# Patient Record
Sex: FEMALE | Race: WHITE | URBAN - METROPOLITAN AREA
[De-identification: names, ages, dates, MRNs, and addresses within clinical notes are randomized per-mention and may not be internally consistent; named-entity substitution may affect disease eponyms.]

---

## 2015-01-26 LAB — PAP SMEAR - HIM PATIENT REPORTED: POSITIVE

## 2016-01-29 LAB — PAP SMEAR - HIM PATIENT REPORTED: POSITIVE

## 2017-02-27 LAB
HPV ABSTRACT: NORMAL
PAP SMEAR - HIM PATIENT REPORTED: NORMAL
TSH SERPL-ACNC: 1.61 MIU/L (ref 0.34–4.82)

## 2017-11-25 ENCOUNTER — OFFICE VISIT (OUTPATIENT)
Dept: URGENT CARE | Facility: URGENT CARE | Age: 32
End: 2017-11-25
Payer: COMMERCIAL

## 2017-11-25 ENCOUNTER — APPOINTMENT (OUTPATIENT)
Dept: ULTRASOUND IMAGING | Facility: CLINIC | Age: 32
End: 2017-11-25
Attending: EMERGENCY MEDICINE
Payer: COMMERCIAL

## 2017-11-25 ENCOUNTER — HOSPITAL ENCOUNTER (EMERGENCY)
Facility: CLINIC | Age: 32
Discharge: HOME | End: 2017-11-25
Attending: EMERGENCY MEDICINE | Admitting: EMERGENCY MEDICINE
Payer: COMMERCIAL

## 2017-11-25 VITALS
HEIGHT: 66 IN | BODY MASS INDEX: 36.96 KG/M2 | WEIGHT: 230 LBS | SYSTOLIC BLOOD PRESSURE: 141 MMHG | OXYGEN SATURATION: 98 % | TEMPERATURE: 98.5 F | DIASTOLIC BLOOD PRESSURE: 79 MMHG

## 2017-11-25 VITALS
DIASTOLIC BLOOD PRESSURE: 78 MMHG | HEART RATE: 88 BPM | RESPIRATION RATE: 16 BRPM | SYSTOLIC BLOOD PRESSURE: 125 MMHG | WEIGHT: 235 LBS | OXYGEN SATURATION: 98 %

## 2017-11-25 DIAGNOSIS — Z32.01 PREGNANCY TEST POSITIVE: Primary | ICD-10-CM

## 2017-11-25 DIAGNOSIS — Z32.01 PREGNANCY TEST POSITIVE: ICD-10-CM

## 2017-11-25 LAB
B-HCG SERPL-ACNC: 2232 IU/L (ref 0–5)
BASOPHILS # BLD AUTO: 0 10E9/L (ref 0–0.2)
BASOPHILS NFR BLD AUTO: 0.4 %
BETA HCG QUAL IFA URINE: POSITIVE
DIFFERENTIAL METHOD BLD: NORMAL
EOSINOPHIL # BLD AUTO: 0.2 10E9/L (ref 0–0.7)
EOSINOPHIL NFR BLD AUTO: 2.9 %
ERYTHROCYTE [DISTWIDTH] IN BLOOD BY AUTOMATED COUNT: 13.5 % (ref 10–15)
HCT VFR BLD AUTO: 38.9 % (ref 35–47)
HGB BLD-MCNC: 13.2 G/DL (ref 11.7–15.7)
IMM GRANULOCYTES # BLD: 0 10E9/L (ref 0–0.4)
IMM GRANULOCYTES NFR BLD: 0 %
LYMPHOCYTES # BLD AUTO: 1.8 10E9/L (ref 0.8–5.3)
LYMPHOCYTES NFR BLD AUTO: 32.9 %
MCH RBC QN AUTO: 29.5 PG (ref 26.5–33)
MCHC RBC AUTO-ENTMCNC: 33.9 G/DL (ref 31.5–36.5)
MCV RBC AUTO: 87 FL (ref 78–100)
MONOCYTES # BLD AUTO: 0.7 10E9/L (ref 0–1.3)
MONOCYTES NFR BLD AUTO: 13.2 %
NEUTROPHILS # BLD AUTO: 2.8 10E9/L (ref 1.6–8.3)
NEUTROPHILS NFR BLD AUTO: 50.6 %
NRBC # BLD AUTO: 0 10*3/UL
NRBC BLD AUTO-RTO: 0 /100
PLATELET # BLD AUTO: 304 10E9/L (ref 150–450)
RBC # BLD AUTO: 4.47 10E12/L (ref 3.8–5.2)
WBC # BLD AUTO: 5.5 10E9/L (ref 4–11)

## 2017-11-25 PROCEDURE — 99284 EMERGENCY DEPT VISIT MOD MDM: CPT | Mod: 25

## 2017-11-25 PROCEDURE — 84703 CHORIONIC GONADOTROPIN ASSAY: CPT | Performed by: INTERNAL MEDICINE

## 2017-11-25 PROCEDURE — 99202 OFFICE O/P NEW SF 15 MIN: CPT | Performed by: INTERNAL MEDICINE

## 2017-11-25 PROCEDURE — 85025 COMPLETE CBC W/AUTO DIFF WBC: CPT | Performed by: EMERGENCY MEDICINE

## 2017-11-25 PROCEDURE — 84702 CHORIONIC GONADOTROPIN TEST: CPT | Performed by: EMERGENCY MEDICINE

## 2017-11-25 PROCEDURE — 76801 OB US < 14 WKS SINGLE FETUS: CPT

## 2017-11-25 RX ORDER — COPPER 313.4 MG/1
1 INTRAUTERINE DEVICE INTRAUTERINE ONCE
COMMUNITY
End: 2017-12-13

## 2017-11-25 ASSESSMENT — ENCOUNTER SYMPTOMS
NAUSEA: 1
VOMITING: 0
ABDOMINAL PAIN: 0

## 2017-11-25 NOTE — ED AVS SNAPSHOT
Emergency Department    [address] [facility] [address]    Phone:  [phone]    Fax:  [phone]                                       Elizabeth Crooks   MRN: [id number]    Department:   Emergency Department   Date of Visit:  11/25/2017           Patient Information     Date Of Birth          1985        Your diagnoses for this visit were:     Pregnancy test positive        You were seen by Sophia Layne MD.      Follow-up Information     Follow up with  Emergency Department.    Specialty:  EMERGENCY MEDICINE    Why:  immediately , If symptoms worsen    Contact information:    [address] [facility] [address] [phone]        Follow up with Nissa Alonzo MD In 1 week.    Specialty:  OB/Gyn    Contact information:    [place] [place]  [address] [place] [address] [phone]        Discharge References/Attachments     PREGNANCY, ESTABLISHED, NORMAL SYMPTOMS (ENGLISH)      24 Hour Appointment Hotline       To make an appointment at any [place] clinic, call [phone] ([phone]). If you don't have a family doctor or clinic, we will help you find one. [place] clinics are conveniently located to serve the needs of you and your family.             Review of your medicines      Our records show that you are taking the medicines listed below. If these are incorrect, please call your family doctor or clinic.        Dose / Directions Last dose taken    paragard intrauterine copper   Dose:  1 each        1 each by Intrauterine route once   Refills:  0                Procedures and tests performed during your visit     CBC with platelets differential    HCG quantitative pregnancy    US OB <14 Weeks W Transvaginal      Orders Needing Specimen Collection     None      Pending Results     Date and Time Order Name Status Description    11/25/2017 1009 US OB <14 Weeks W Transvaginal Preliminary             Pending Culture Results      No orders found from 11/23/2017 to 11/26/2017.            Pending Results Instructions     If you had any lab results that were not finalized at the time of your Discharge, you can call the ED Lab Result RN at [phone]. You will be contacted by this team for any positive Lab results or changes in treatment. The nurses are available 7 days a week from 10A to 6:30P.  You can leave a message 24 hours per day and they will return your call.        Test Results From Your Hospital Stay        11/25/2017 11:10 AM      Component Results     Component Value Ref Range & Units Status    HCG Quantitative Serum 2232 (H) 0 - 5 IU/L Final         11/25/2017 10:36 AM      Component Results     Component Value Ref Range & Units Status    WBC 5.5 4.0 - 11.0 10e9/L Final    RBC Count 4.47 3.8 - 5.2 10e12/L Final    Hemoglobin 13.2 11.7 - 15.7 g/dL Final    Hematocrit 38.9 35.0 - 47.0 % Final    MCV 87 78 - 100 fl Final    MCH 29.5 26.5 - 33.0 pg Final    MCHC 33.9 31.5 - 36.5 g/dL Final    RDW 13.5 10.0 - 15.0 % Final    Platelet Count 304 150 - 450 10e9/L Final    Diff Method Automated Method  Final    % Neutrophils 50.6 % Final    % Lymphocytes 32.9 % Final    % Monocytes 13.2 % Final    % Eosinophils 2.9 % Final    % Basophils 0.4 % Final    % Immature Granulocytes 0.0 % Final    Nucleated RBCs 0 0 /100 Final    Absolute Neutrophil 2.8 1.6 - 8.3 10e9/L Final    Absolute Lymphocytes 1.8 0.8 - 5.3 10e9/L Final    Absolute Monocytes 0.7 0.0 - 1.3 10e9/L Final    Absolute Eosinophils 0.2 0.0 - 0.7 10e9/L Final    Absolute Basophils 0.0 0.0 - 0.2 10e9/L Final    Abs Immature Granulocytes 0.0 0 - 0.4 10e9/L Final    Absolute Nucleated RBC 0.0  Final         11/25/2017 11:21 AM      Narrative     ULTRASOUND OBSTETRIC LESS THAN 14 WEEKS WITH TRANSVAGINAL SINGLE   11/25/2017 11:15 AM    HISTORY: Suspects she has IUD, positive pregnancy, LMP 10/11.     TECHNIQUE: Transabdominal and transvaginal imaging were performed.  Transvaginal  imaging was performed to better evaluate the uterus and  gestational sac.    COMPARISON: None.    FINDINGS: There is a small fluid collection in the uterus, likely a  gestational sac. It measures 5 mm corresponding with a gestational age  of 5 weeks 1 day. No yolk sac or fetal pole is identified.    Both ovaries are normal in appearance. No adnexal masses are seen.  There is no free fluid in the pelvis.         Impression     IMPRESSION: Small fluid collection in the uterus likely representing  an early gestational sac. No yolk sac or fetal pole is seen.                     Clinical Quality Measure: Blood Pressure Screening     Your blood pressure was checked while you were in the emergency department today. The last reading we obtained was  BP: 141/79 . Please read the guidelines below about what these numbers mean and what you should do about them.  If your systolic blood pressure (the top number) is less than 120 and your diastolic blood pressure (the bottom number) is less than 80, then your blood pressure is normal. There is nothing more that you need to do about it.  If your systolic blood pressure (the top number) is 120-139 or your diastolic blood pressure (the bottom number) is 80-89, your blood pressure may be higher than it should be. You should have your blood pressure rechecked within a year by a primary care provider.  If your systolic blood pressure (the top number) is 140 or greater or your diastolic blood pressure (the bottom number) is 90 or greater, you may have high blood pressure. High blood pressure is treatable, but if left untreated over time it can put you at risk for heart attack, stroke, or kidney failure. You should have your blood pressure rechecked by a primary care provider within the next 4 weeks.  If your provider in the emergency department today gave you specific instructions to follow-up with your doctor or provider even sooner than that, you should follow that instruction and not  "wait for up to 4 weeks for your follow-up visit.        Thank you for choosing [facility]       Thank you for choosing [facility] for your care. Our goal is always to provide you with excellent care. Hearing back from our patients is one way we can continue to improve our services. Please take a few minutes to complete the written survey that you may receive in the mail after you visit with us. Thank you!        Lakoohart Information     [employer] lets you send messages to your doctor, view your test results, renew your prescriptions, schedule appointments and more. To sign up, go to [email or web address] . Click on \"Log in\" on the left side of the screen, which will take you to the Welcome page. Then click on \"Sign up Now\" on the right side of the page.     You will be asked to enter the access code listed below, as well as some personal information. Please follow the directions to create your username and password.     Your access code is: [email or web address]  Expires: 2018 11:44 AM     Your access code will  in 90 days. If you need help or a new code, please call your [facility] clinic or [phone].        Care EveryWhere ID     This is your Care EveryWhere ID. This could be used by other organizations to access your [facility] medical records  [id number]        Equal Access to Services     KYLE ROCHA AH: Rosalio Chau, waaxda luqadaha, qaybta kaalmada antonette, yadira sellers. So [facility] [phone].    ATENCIÓN: Si habla español, tiene a mcrae disposición servicios gratuitos de asistencia lingüística. Llame al [phone].    We comply with applicable federal civil rights laws and [place] laws. We do not discriminate on the basis of race, color, national origin, age, disability, sex, sexual orientation, or gender identity.            After Visit Summary       This is your record. Keep this with you and show to your community pharmacist(s) and doctor(s) at your next " visit.

## 2017-11-25 NOTE — ED PROVIDER NOTES
"  History     Chief Complaint:  IUD issue    HPI   Elizabeth Crooks is a 32 year old  pregnant female who presents with an IUD issues. The patient states that she has had an IUD in place for almost one year but has had multiple positive pregnancy tests now. She states that her last menstrual cycle occurred approximately 6 weeks ago. She reports feeling nauseous yesterday without any vomiting and became concerned that she may be pregnant. She took two positive pregnancy tests at home and today went to Urgent Care where she had a positive HCG urine test as well. She notes that she does have fairly regular menstrual cycles. She denies having any abdominal pain at this time. Of note the patient has recently relocated to [place].    Allergies:  No Known Drug Allergies      Medications:    Paragard intrauterine copper    Past Medical History:    Thyroid disease    Past Surgical History:    History reviewed. No pertinent past surgical history.     Family History:    The patient denies any relevant family medical history.     Social History:  Smoking Status: No  Smokeless Tobacco: No  Alcohol Use: No   Marital Status:   [2]    Review of Systems   Gastrointestinal: Positive for nausea. Negative for abdominal pain and vomiting.   All other systems reviewed and are negative.      Physical Exam   Vitals:  Patient Vitals for the past 24 hrs:   BP Temp Temp src Heart Rate SpO2 Height Weight   17 0944 141/79 98.5  F (36.9  C) Oral 102 98 % 1.676 m (5' 6\") 104.3 kg (230 lb)        Physical Exam  Gen: Pleasant, appears stated age.    Eye:   Pupils are equal, round, and reactive.     Sclera non-injected.    ENT:   Moist mucus membranes.     Normal tongue.    Oropharynx without lesions.    Cardiac:     Normal rate and regular rhythm.    No murmurs, gallops, or rubs.    Pulmonary:     Clear to auscultation bilaterally.    No wheezes, rales, or rhonchi.    Abdomen:     Normal active bowel sounds.     Abdomen is " soft and non-distended, without focal tenderness.    Musculoskeletal:     Normal movement of all extremities without evidence for deficit.    Extremities:    No edema.    Skin:   Warm and dry.    Neurologic:    Non-focal exam without asymmetric weakness or numbness.    Normal tone    Psychiatric:     Normal affect with appropriate interaction with examiner.     :  External exam: no lesions, no erythema/cellulitis  Internal exam: Normal introitus.   No vaginal discharge noted. No IUD in vault.  No blood noted.  Bimanual exam:    No Cervical motion tenderness.  No string from IUD.  Bilateral adnexa non-tender.  Uterus non-tender    Emergency Department Course   Imaging:  Radiology findings were communicated with the patient who voiced understanding of the findings.  US OB <14 weeks w transvaginal:  IMPRESSION: Small fluid collection in the uterus likely representing  an early gestational sac. No yolk sac or fetal pole is seen.  Reading per radiology.     Laboratory:  Laboratory findings were communicated with the patient who voiced understanding of the findings.  HCG quantitative: 2232  CBC: AWNL (WBC 5.5, HGB 13.2, )     Emergency Department Course:  Nursing notes and vitals reviewed.  I performed an exam of the patient as documented above.   IV was inserted and blood was drawn for laboratory testing, results above.     1149 I rechecked with the patient    I discussed the treatment plan with the patient. They expressed understanding of this plan and consented to discharge. They will be discharged home with instructions for care and follow up. In addition, the patient will return to the emergency department if their symptoms persist, worsen, if new symptoms arise or if there is any concern.  All questions were answered.     I personally reviewed the laboratory results with the patient and answered all related questions prior to discharge.    Impression & Plan      Medical Decision Making:  Elizabeth Crooks  is a 32 year old  female who presents to the emergency department today with pregnancy in the setting of an IUD. On exam, the patient is well appearing with a benign abdominal exam. Pelvic is negative for IUD strings or displacement of the IUD. She was confirmed pregnant with an HCG appropriate for dates. Pelvic ultrasound demonstrates a gestational sac but no fetal heartbeat. There was no IUD on ultrasound. At this point, it seems that she is having a normal first trimester pregnancy. She has had no vaginal bleeding or dysuria. She is new to this [facility] but plans to follow up with [facility] for her Obstetrical care.     Diagnosis:    ICD-10-CM    1. Pregnancy test positive Z32.01         Disposition:   Discharged    CMS Diagnoses: None     Scribe Disclosure:  Koby MELO, am serving as a scribe at 10:02 AM on 2017 to document services personally performed by Sophia Layne MD, based on my observations and the provider's statements to me.    EMERGENCY DEPARTMENT       Sophia Layne MD  17 [phone]

## 2017-11-25 NOTE — ED AVS SNAPSHOT
Emergency Department    [address] [facility] [address]    Phone:  [phone]    Fax:  [phone]                                       Elizabeth Crooks   MRN: [id number]    Department:   Emergency Department   Date of Visit:  11/25/2017           After Visit Summary Signature Page     I have received my discharge instructions, and my questions have been answered. I have discussed any challenges I see with this plan with the nurse or doctor.    ..........................................................................................................................................  Patient/Patient Representative Signature      ..........................................................................................................................................  Patient Representative Print Name and Relationship to Patient    ..................................................               ................................................  Date                                            Time    ..........................................................................................................................................  Reviewed by Signature/Title    ...................................................              ..............................................  Date                                                            Time

## 2017-11-27 ENCOUNTER — TELEPHONE (OUTPATIENT)
Dept: MIDWIFE SERVICES | Facility: CLINIC | Age: 32
End: 2017-11-27

## 2017-11-28 NOTE — TELEPHONE ENCOUNTER
"Patient sent a web request today.    \"Establish Pregnancy Care.\"    Patient would like to schedule with Tash Pennington Midwife at [facility].    Please contact patient.    Thank you.    Central Scheduling  Izabella BLACK.  "

## 2017-12-12 DIAGNOSIS — O36.80X0 PREGNANCY WITH INCONCLUSIVE FETAL VIABILITY: Primary | ICD-10-CM

## 2017-12-12 NOTE — NURSING NOTE
Pt has NOB scheduled with ultrasound. Needed order to be placed. Future ultrasound order placed and linked with appt. Closing encounter.

## 2017-12-13 ENCOUNTER — RADIANT APPOINTMENT (OUTPATIENT)
Dept: ULTRASOUND IMAGING | Facility: CLINIC | Age: 32
End: 2017-12-13
Payer: COMMERCIAL

## 2017-12-13 ENCOUNTER — PRENATAL OFFICE VISIT (OUTPATIENT)
Dept: MIDWIFE SERVICES | Facility: CLINIC | Age: 32
End: 2017-12-13
Payer: COMMERCIAL

## 2017-12-13 VITALS
HEIGHT: 66 IN | HEART RATE: 88 BPM | WEIGHT: 237.8 LBS | SYSTOLIC BLOOD PRESSURE: 112 MMHG | BODY MASS INDEX: 38.22 KG/M2 | DIASTOLIC BLOOD PRESSURE: 76 MMHG

## 2017-12-13 DIAGNOSIS — Z34.91 UNCERTAIN DATES, ANTEPARTUM, FIRST TRIMESTER: ICD-10-CM

## 2017-12-13 DIAGNOSIS — Z13.79 GENETIC SCREENING: ICD-10-CM

## 2017-12-13 DIAGNOSIS — O99.211 OBESITY AFFECTING PREGNANCY IN FIRST TRIMESTER: ICD-10-CM

## 2017-12-13 DIAGNOSIS — O36.80X0 PREGNANCY WITH INCONCLUSIVE FETAL VIABILITY: ICD-10-CM

## 2017-12-13 DIAGNOSIS — Z3A.01 7 WEEKS GESTATION OF PREGNANCY: ICD-10-CM

## 2017-12-13 DIAGNOSIS — O09.91 SUPERVISION OF HIGH RISK PREGNANCY IN FIRST TRIMESTER: Primary | ICD-10-CM

## 2017-12-13 DIAGNOSIS — Z23 NEED FOR PROPHYLACTIC VACCINATION AND INOCULATION AGAINST INFLUENZA: ICD-10-CM

## 2017-12-13 PROBLEM — Z34.80 SUPERVISION OF NORMAL INTRAUTERINE PREGNANCY IN MULTIGRAVIDA: Status: ACTIVE | Noted: 2017-12-13

## 2017-12-13 LAB
ALBUMIN UR-MCNC: NEGATIVE MG/DL
APPEARANCE UR: CLEAR
BACTERIA #/AREA URNS HPF: ABNORMAL /HPF
BASOPHILS # BLD AUTO: 0 10E9/L (ref 0–0.2)
BASOPHILS NFR BLD AUTO: 0.1 %
BILIRUB UR QL STRIP: NEGATIVE
COLOR UR AUTO: YELLOW
DIFFERENTIAL METHOD BLD: NORMAL
EOSINOPHIL # BLD AUTO: 0.2 10E9/L (ref 0–0.7)
EOSINOPHIL NFR BLD AUTO: 2.3 %
ERYTHROCYTE [DISTWIDTH] IN BLOOD BY AUTOMATED COUNT: 13.3 % (ref 10–15)
GLUCOSE UR STRIP-MCNC: NEGATIVE MG/DL
HBA1C MFR BLD: 5.1 % (ref 4.3–6)
HCT VFR BLD AUTO: 40.8 % (ref 35–47)
HGB BLD-MCNC: 13.5 G/DL (ref 11.7–15.7)
HGB UR QL STRIP: ABNORMAL
KETONES UR STRIP-MCNC: NEGATIVE MG/DL
LEUKOCYTE ESTERASE UR QL STRIP: NEGATIVE
LYMPHOCYTES # BLD AUTO: 2.3 10E9/L (ref 0.8–5.3)
LYMPHOCYTES NFR BLD AUTO: 29.5 %
MCH RBC QN AUTO: 28.9 PG (ref 26.5–33)
MCHC RBC AUTO-ENTMCNC: 33.1 G/DL (ref 31.5–36.5)
MCV RBC AUTO: 87 FL (ref 78–100)
MONOCYTES # BLD AUTO: 1 10E9/L (ref 0–1.3)
MONOCYTES NFR BLD AUTO: 13 %
NEUTROPHILS # BLD AUTO: 4.3 10E9/L (ref 1.6–8.3)
NEUTROPHILS NFR BLD AUTO: 55.1 %
NITRATE UR QL: NEGATIVE
NON-SQ EPI CELLS #/AREA URNS LPF: ABNORMAL /LPF
PH UR STRIP: 6 PH (ref 5–7)
PLATELET # BLD AUTO: 334 10E9/L (ref 150–450)
RBC # BLD AUTO: 4.67 10E12/L (ref 3.8–5.2)
RBC #/AREA URNS AUTO: ABNORMAL /HPF
SOURCE: ABNORMAL
SP GR UR STRIP: 1.01 (ref 1–1.03)
UROBILINOGEN UR STRIP-ACNC: 0.2 EU/DL (ref 0.2–1)
WBC # BLD AUTO: 7.7 10E9/L (ref 4–11)
WBC #/AREA URNS AUTO: ABNORMAL /HPF

## 2017-12-13 PROCEDURE — 76817 TRANSVAGINAL US OBSTETRIC: CPT | Performed by: OBSTETRICS & GYNECOLOGY

## 2017-12-13 PROCEDURE — 36415 COLL VENOUS BLD VENIPUNCTURE: CPT | Performed by: ADVANCED PRACTICE MIDWIFE

## 2017-12-13 PROCEDURE — 86901 BLOOD TYPING SEROLOGIC RH(D): CPT | Performed by: ADVANCED PRACTICE MIDWIFE

## 2017-12-13 PROCEDURE — 86900 BLOOD TYPING SEROLOGIC ABO: CPT | Performed by: ADVANCED PRACTICE MIDWIFE

## 2017-12-13 PROCEDURE — 81001 URINALYSIS AUTO W/SCOPE: CPT | Performed by: ADVANCED PRACTICE MIDWIFE

## 2017-12-13 PROCEDURE — 85025 COMPLETE CBC W/AUTO DIFF WBC: CPT | Performed by: ADVANCED PRACTICE MIDWIFE

## 2017-12-13 PROCEDURE — 99207 ZZC FIRST OB VISIT: CPT | Performed by: ADVANCED PRACTICE MIDWIFE

## 2017-12-13 PROCEDURE — 90471 IMMUNIZATION ADMIN: CPT | Performed by: ADVANCED PRACTICE MIDWIFE

## 2017-12-13 PROCEDURE — 86850 RBC ANTIBODY SCREEN: CPT | Performed by: ADVANCED PRACTICE MIDWIFE

## 2017-12-13 PROCEDURE — 83036 HEMOGLOBIN GLYCOSYLATED A1C: CPT | Performed by: ADVANCED PRACTICE MIDWIFE

## 2017-12-13 PROCEDURE — 87340 HEPATITIS B SURFACE AG IA: CPT | Performed by: ADVANCED PRACTICE MIDWIFE

## 2017-12-13 PROCEDURE — 86762 RUBELLA ANTIBODY: CPT | Performed by: ADVANCED PRACTICE MIDWIFE

## 2017-12-13 PROCEDURE — 87086 URINE CULTURE/COLONY COUNT: CPT | Performed by: ADVANCED PRACTICE MIDWIFE

## 2017-12-13 PROCEDURE — 86780 TREPONEMA PALLIDUM: CPT | Performed by: ADVANCED PRACTICE MIDWIFE

## 2017-12-13 PROCEDURE — 90686 IIV4 VACC NO PRSV 0.5 ML IM: CPT | Performed by: ADVANCED PRACTICE MIDWIFE

## 2017-12-13 PROCEDURE — 87389 HIV-1 AG W/HIV-1&-2 AB AG IA: CPT | Performed by: ADVANCED PRACTICE MIDWIFE

## 2017-12-13 PROCEDURE — 84443 ASSAY THYROID STIM HORMONE: CPT | Performed by: ADVANCED PRACTICE MIDWIFE

## 2017-12-13 PROCEDURE — 82306 VITAMIN D 25 HYDROXY: CPT | Performed by: ADVANCED PRACTICE MIDWIFE

## 2017-12-13 RX ORDER — PRENATAL VIT/IRON FUM/FOLIC AC 27MG-0.8MG
1 TABLET ORAL DAILY
COMMUNITY

## 2017-12-13 ASSESSMENT — ANXIETY QUESTIONNAIRES
5. BEING SO RESTLESS THAT IT IS HARD TO SIT STILL: NOT AT ALL
2. NOT BEING ABLE TO STOP OR CONTROL WORRYING: NOT AT ALL
1. FEELING NERVOUS, ANXIOUS, OR ON EDGE: SEVERAL DAYS
7. FEELING AFRAID AS IF SOMETHING AWFUL MIGHT HAPPEN: NOT AT ALL
3. WORRYING TOO MUCH ABOUT DIFFERENT THINGS: NOT AT ALL
IF YOU CHECKED OFF ANY PROBLEMS ON THIS QUESTIONNAIRE, HOW DIFFICULT HAVE THESE PROBLEMS MADE IT FOR YOU TO DO YOUR WORK, TAKE CARE OF THINGS AT HOME, OR GET ALONG WITH OTHER PEOPLE: NOT DIFFICULT AT ALL
6. BECOMING EASILY ANNOYED OR IRRITABLE: NOT AT ALL
GAD7 TOTAL SCORE: 1

## 2017-12-13 ASSESSMENT — PATIENT HEALTH QUESTIONNAIRE - PHQ9
SUM OF ALL RESPONSES TO PHQ QUESTIONS 1-9: 5
5. POOR APPETITE OR OVEREATING: NOT AT ALL

## 2017-12-13 NOTE — MR AVS SNAPSHOT
After Visit Summary   12/13/2017    Elizabeth Crooks    MRN: [id number]           Patient Information     Date Of Birth          1985        Visit Information        Provider Department      12/13/2017 2:00 PM Tash Pennington APRN CNM; [facility] [place]        Today's Diagnoses     Supervision of normal intrauterine pregnancy in multigravida in first trimester    -  1    Supervision of normal intrauterine pregnancy in multigravida        Need for prophylactic vaccination and inoculation against influenza           Follow-ups after your visit        Your next 10 appointments already scheduled     Dec 29, 2017  8:00 AM CST   US PELVIC COMPLETE W TRANSVAGINAL with WEUS1   [facility] [place] ([facility] [place])    [address]  [facility] [address] [phone]           Please bring a list of your medicines (including vitamins, minerals and over-the-counter drugs). Also, tell your doctor about any allergies you may have. Wear comfortable clothes and leave your valuables at home.  Adults: Drink six 8-ounce glasses of fluid one hour before your exam. Do NOT empty your bladder.  If you need to empty your bladder before your exam, try to release only a little bit of urine. Then, drink another 8oz glass of fluid.  Children: Children who are potty trained should drink at least 4 cups (32 oz) of liquid 45 minutes to one hour prior to the exam. The child s bladder must be full in order to achieve a diagnostic exam. If your child is very uncomfortable or has an urgent need to pee, please notify a technologist; they will try to find out how much longer the wait may be and provide instructions to help relieve the pressure. Occasionally it is medically necessary to insert a urinary catheter to fill the bladder.  Please call the Imaging Department at your exam site with any questions.            Dec 29, 2017  8:40 AM CST   LAB  "with [facility]   [facility] [place] ([facility]    [address]  [place] [address] [phone]           Please do not eat 10-12 hours before your appointment if you are coming in fasting for labs on lipids, cholesterol, or glucose (sugar). This does not apply to pregnant women. Water, hot tea and black coffee (with nothing added) are okay. Do not drink other fluids, diet soda or chew gum.            Dec 29, 2017  8:50 AM CST   ESTABLISHED PRENATAL with SINCERE Abdi CNM   [facility] [place] ([facility])    [address]  [place] [address] [phone]              Who to contact     If you have questions or need follow up information about today's clinic visit or your schedule please contact [facility] directly at [phone].  Normal or non-critical lab and imaging results will be communicated to you by Arlediahart, letter or phone within 4 business days after the clinic has received the results. If you do not hear from us within 7 days, please contact the clinic through [employer] or phone. If you have a critical or abnormal lab result, we will notify you by phone as soon as possible.  Submit refill requests through NPTV or call your pharmacy and they will forward the refill request to us. Please allow 3 business days for your refill to be completed.          Additional Information About Your Visit        NPTV Information     NPTV lets you send messages to your doctor, view your test results, renew your prescriptions, schedule appointments and more. To sign up, go to [email or web address] . Click on \"Log in\" on the left side of the screen, which will take you to the Welcome page. Then click on \"Sign up Now\" on the right side of the page.     You will be asked to enter the access code listed below, as well as some personal information. Please follow the " "directions to create your username and password.     Your access code is: [email or web address]  Expires: 2018 11:44 AM     Your access code will  in 90 days. If you need help or a new code, please call your [facility] clinic or [phone].        Care EveryWhere ID     This is your Care EveryWhere ID. This could be used by other organizations to access your [facility] medical records  [id number]        Your Vitals Were     Pulse Height Last Period BMI (Body Mass Index)          88 5' 6\" (1.676 m) 10/11/2017 38.38 kg/m2         Blood Pressure from Last 3 Encounters:   17 112/76   17 141/79   17 125/78    Weight from Last 3 Encounters:   17 237 lb 12.8 oz (107.9 kg)   17 230 lb (104.3 kg)   17 235 lb (106.6 kg)              We Performed the Following     ABO/Rh type and screen     Anti Treponema     CBC with platelets differential     FLU VAC, SPLIT VIRUS IM > 3 YO (QUADRIVALENT) [24974]     Hemoglobin A1c     Hepatitis B surface antigen     HIV Antigen Antibody Combo     Rubella Antibody IgG Quantitative     TSH with free T4 reflex     UA with Microscopic     Urine Culture Aerobic Bacterial     Vaccine Administration, Initial [76069]     Vitamin D Deficiency        Primary Care Provider    Provider Outside       No address on file        Equal Access to Services     KYLE ROCHA : Hadii alejandrina ku hadasho Soomaali, waaxda luqadaha, qaybta kaalmada adeinderjit, yadira sellers. So [facility] [phone].    ATENCIÓN: Si habla español, tiene a mcrae disposición servicios gratuitos de asistencia lingüística. Llame al [phone].    We comply with applicable federal civil rights laws and [place] laws. We do not discriminate on the basis of race, color, national origin, age, disability, sex, sexual orientation, or gender identity.            Thank you!     Thank you for choosing [facility]  for your care. Our goal is always to provide you with " excellent care. Hearing back from our patients is one way we can continue to improve our services. Please take a few minutes to complete the written survey that you may receive in the mail after your visit with us. Thank you!             Your Updated Medication List - Protect others around you: Learn how to safely use, store and throw away your medicines at www.disposemymeds.org.          This list is accurate as of: 12/13/17  3:43 PM.  Always use your most recent med list.                   Brand Name Dispense Instructions for use Diagnosis    paragard intrauterine copper      1 each by Intrauterine route once        prenatal multivitamin plus iron 27-0.8 MG Tabs per tablet      Take 1 tablet by mouth daily    Supervision of normal intrauterine pregnancy in multigravida in first trimester, Supervision of normal intrauterine pregnancy in multigravida       SYNTHROID PO       Supervision of normal intrauterine pregnancy in multigravida in first trimester, Supervision of normal intrauterine pregnancy in multigravida

## 2017-12-13 NOTE — PROGRESS NOTES

## 2017-12-13 NOTE — PROGRESS NOTES
Results discussed directly with patient while patient was present. Any further details documented in the note.    Tash Pennington DNP, APRN, CNM

## 2017-12-13 NOTE — PROGRESS NOTES
Elizabeth Crooks is a 32 year old  ,  who is not a previous CNM patient. She presents for a new OB Visit. This was not a planned pregnancy.     FOB is Will who is in good health.  FOB IS actively involved in relationship and this pregnancy.    Elizabeth had a Paragard IUD that was spontaneously expelled.  She began having pregnancy symptoms and took a pregnancy test, which was positive.  She was then seen for an ultrasound in the emergency department, which confirmed the pregnancy and also confirmed that the IUD had been expelled.    She has not had bleeding since her LMP.    She denies abdominal pain since her LMP. Some mild cramping. Has also had some lightheadedness with this pregnancy, usually resolves with food, water, rest.  Discussed warning signs and when to call.  She has had nausea,  has not had vomiting.  Any personal or family history of blood clots? No  History of sickle cell anemia or trait? No         Patient's last menstrual period was 10/11/2017.  Estimated Date of Delivery: 2018 Ultrasound inconsistent with LMP. MD ultrasound report recommends repeat US in 2 weeks.     MENSTRUAL HISTORY    frequency: every 32-35 days, lasting 4 days  Last PAP:  2017, ASCUS +HPV, had colpo  History of abnormal Pap?  Yes: abnormal +HPV 3 years ago, colpo and biopsy (no precancer), repeated pap in one year ASCUS (+HPV),  Pap the next year in 2017 (abnormal with HPV), had a colpo but did not do a biopsy.  Plan to do a follow-up pap/HPV postpartum.    Health maintenance updated:  yes        Current medications are:    Current Outpatient Prescriptions:      Prenatal Vit-Fe Fumarate-FA (PRENATAL MULTIVITAMIN PLUS IRON) 27-0.8 MG TABS per tablet, Take 1 tablet by mouth daily, Disp: , Rfl:      Levothyroxine Sodium (SYNTHROID PO), , Disp: , Rfl:      INFECTION HISTORY  HIV: No  Hepatitis B: No  Hepatitis C: No  Tuberculosis: No    Genital Herpes self: no  Herpes partner:   no  Chlamydia:  no  Gonorrhea:  no  HPV: Yes - see above  BV:  No  Syphilis:  No  Chicken Pox:  Yes - as a child      OB HISTORY  Obstetric History       T0      L0     SAB0   TAB0   Ectopic0   Multiple0   Live Births0       # Outcome Date GA Lbr Yao/2nd Weight Sex Delivery Anes PTL Lv   1 Current                   History of GDM: No,  PTL : No,  History of HTN in pregnancy: No,  Thrombocytopenia: No,  Shoulder dystocia: No,  Vacuum Extraction: No  PPH: No   3rd of 4th degree laceration: No, believes she had a small vaginal tissue tear that required only one stitch.   Other complications: No    PERSONAL HISTORY  Exercise Habits:  Weekly hikes/walk, weekly pilates  Employment: Part time.  Her job  involves sedentary activity with no potential for toxic exposure.    Travel plans:  are intra [place] air travel.   Diet: eats regular meals and follows a balanced nutrition diet  Abuse concerns? Not addressed  Hgb A1c screen:  BMI > 30: Yes, 1st degree family DM: No, History of GDM: No, PCOS: No, High risk ethnicity: No    Social History     Social History     Marital status:      Spouse name: N/A     Number of children: N/A     Years of education: N/A     Occupational History     Not on file.     Social History Main Topics     Smoking status: Never Smoker     Smokeless tobacco: Never Used     Alcohol use No      Comment: none since pg     Drug use: No     Sexual activity: Yes     Partners: Male     Other Topics Concern     Not on file     Social History Narrative         She  reports that she has never smoked. She has never used smokeless tobacco.      STD testing offered?  Declined  Last PHQ-9 score on record =   PHQ-9 SCORE 2017   Total Score 5     Last GAD7 score on record =   OMAR-7 SCORE 2017   Total Score 1     Alcohol Score = 0 in pregnancy  Referral/Meds needed? N/A     PAST MEDICAL/SURGICAL HISTORY  Past Medical History:   Diagnosis Date     Thyroid disease   "    Past Surgical History:   Procedure Laterality Date     GYN SURGERY  03/2017    colopos       FAMILY HISTORY  Family History   Problem Relation Age of Onset     Other Cancer Mother      Other Cancer Maternal Grandfather      Other Cancer Paternal Grandfather          ROS:  12 point review of systems negative other than symptoms noted below.  Constitutional: Weight Gain  Cardiovascular: Lightheadedness  Gastrointestinal: Nausea  Genitourinary: Vaginal Discharge      PHYSICAL EXAM  Vitals: /76  Pulse 88  Ht 5' 6\" (1.676 m)  Wt 237 lb 12.8 oz (107.9 kg)  LMP 10/11/2017  BMI 38.38 kg/m2  BMI= Body mass index is 38.38 kg/(m^2).     GENERAL:  32 year old pleasant pregnant female, alert, cooperative and well groomed.  NECK:  Thyroid without enlargement and nodules.  Lymph nodes not palpable.   LUNGS:  Clear to auscultation.  LOWER EXTREMITIES: No edema. No significant varicosities.    ASSESSMENT/PLAN:    IUP at 7w5d    ICD-10-CM    1. Supervision of normal intrauterine pregnancy in multigravida in first trimester Z34.81 UA with Microscopic     Urine Culture Aerobic Bacterial     ABO/Rh type and screen     Anti Treponema     CBC with platelets differential     Hepatitis B surface antigen     HIV Antigen Antibody Combo     Rubella Antibody IgG Quantitative     Prenatal Vit-Fe Fumarate-FA (PRENATAL MULTIVITAMIN PLUS IRON) 27-0.8 MG TABS per tablet     Levothyroxine Sodium (SYNTHROID PO)     Hemoglobin A1c     Vitamin D Deficiency     TSH with free T4 reflex   2. Supervision of normal intrauterine pregnancy in multigravida Z34.80 UA with Microscopic     Urine Culture Aerobic Bacterial     Prenatal Vit-Fe Fumarate-FA (PRENATAL MULTIVITAMIN PLUS IRON) 27-0.8 MG TABS per tablet     Levothyroxine Sodium (SYNTHROID PO)   3. Need for prophylactic vaccination and inoculation against influenza Z23 FLU VAC, SPLIT VIRUS IM > 3 YO (QUADRIVALENT) [92042]     Vaccine Administration, Initial [19059]   4. Uncertain dates, " antepartum, first trimester Z34.91 US OB < 14 Weeks Single   5. Genetic screening Z13.79 Progenity Standard Panel: Laboratory Miscellaneous Order   6. Supervision of high risk pregnancy in first trimester O09.91 Non Invasive Prenatal Test Cell Free DNA        consult for US for AMA patients: NA  Genetic Testing reviewed and discussed, patient desires Innatal, Standard Panel and AFP.        COUNSELING    Instructed on use of triage nurse line and contacting the on call CNM after hours in an emergency.     Symptoms of N&V and fatigue usually start to resolve around 12-16 weeks     Reviewed CNM philosophy, call schedule for labor and delivery, and FSH for delivery    Reviewed exercise and nutrition    Recommend to gain 11-20 pounds with her pregnancy.    Discussed OTC medications. OB med list given    Encouraged patient to take PNV's/DHA    Travel precautions discussed, no air travel after 36 weeks and Zika Virus discussed    Will call patient with lab results when available    Flu shot today    Pap/HPV to be collected postpartum    Meet the Midwife information given      F/U to be addressed next visit:  Genetic screening (futured), make sure previous pregnancy/delivery records have been received.    Will return to the clinic in 2 weeks for her next routine prenatal check and follow up ultrasound.  Will call to be seen sooner if problems arise.    Tash Pennington, DNP, APRN, CNM

## 2017-12-13 NOTE — NURSING NOTE
[facility] Obstetrical Risk History    Patient presents for new OB labs and teaching.      1. Please indicate any condition you have or have had in the past:  Thyroid Disorder  2. Abnormal Pap  3. Do you smoke?  No  If yes, how many packs/day?   4. Do you drink alcoholic beverages? No  If yes, how often?  What type?   5. List any medications taken since your last period: Synthroid, Prenatal  6. List any recreational drugs (cocaine, marijuana, etc. used since your last period:None    7. List any chemical or radiation exposure that you've encountered: None  8. Are you on a restricted diet? No  If yes, please describe:  Do you have any Yazidi objections to any form of treatment? No    GENETIC SCREENING    These questions apply to you, the baby's father, or anyone in either family with:    1. Patient's age 35 or greater at delivery? No  2. Divehi, Citizen of [place], Mediterranean ancestry? No  3. Neural Tube Defect (Meningomyelocele, Spina Bifida, or Anencephaly)? No  4. Druze, Sen[place]alese [place] or history of Manny-Sachs disease? No  5. Down's Syndrome?   No  6. Hemophilia or clotting disorder? No  7. Muscular Dystrophy? No  8. Cystic Fibrosis? No  9. Athena's Chorea? No  10. Mental Retardation? No  11. 3 or more miscarriages or a stillborn? No  12. Other inherited disease or chromosomal disorder? No  13. Have you or the baby's father had a child born with a birth defect? No  14. Did you or the baby's father have a birth defect yourselves? No    Do you have any other concerns about birth defects? No

## 2017-12-14 LAB
ABO + RH BLD: NORMAL
ABO + RH BLD: NORMAL
BACTERIA SPEC CULT: NORMAL
BLD GP AB SCN SERPL QL: NORMAL
BLOOD BANK CMNT PATIENT-IMP: NORMAL
Lab: NORMAL
SPECIMEN EXP DATE BLD: NORMAL
SPECIMEN SOURCE: NORMAL
TSH SERPL DL<=0.005 MIU/L-ACNC: 1.16 MU/L (ref 0.4–4)

## 2017-12-14 ASSESSMENT — ANXIETY QUESTIONNAIRES: GAD7 TOTAL SCORE: 1

## 2017-12-15 LAB
DEPRECATED CALCIDIOL+CALCIFEROL SERPL-MC: 14 UG/L (ref 20–75)
HBV SURFACE AG SERPL QL IA: NONREACTIVE
HIV 1+2 AB+HIV1 P24 AG SERPL QL IA: NONREACTIVE
RUBV IGG SERPL IA-ACNC: 111 IU/ML
T PALLIDUM IGG+IGM SER QL: NEGATIVE

## 2017-12-15 NOTE — PROGRESS NOTES
I spoke with Elizabeth and informed her of results. Will start to take 4000 IU of vitamin D daily.  Questions answered.    Anupama Byers

## 2017-12-20 ENCOUNTER — TELEPHONE (OUTPATIENT)
Dept: MIDWIFE SERVICES | Facility: CLINIC | Age: 32
End: 2017-12-20

## 2017-12-20 NOTE — TELEPHONE ENCOUNTER
Is about 8 weeks - just fell on back stairs - she is sure everything is ok but wants to make sure - ok to leave message

## 2017-12-20 NOTE — TELEPHONE ENCOUNTER
8w5d, KEVIN 7/27/17. Pt fell today about 2 hours ago. No blow to abdomen. Slipped down back stair, about 3 stairs. Pt went down on hip and butt. Pt did not hit head or back or abdomen. Pt denies vaginal bleeding, or LOF, couple little cramps but has had cramps before fall. Paged Tabatha Ortega to review. Pt is to go to the ED if starts bleeding. Pt informed

## 2017-12-26 NOTE — PROGRESS NOTES
[facility] Progress Note        Lenora Rodríguez MD, MPH  11-        History:      A pleasant 32 year old year old female is seen for poosible pregnancy stating that she had 2 pregnancy tests that wer positive at home. She wants to confirm that she is pregnant and is also concerned that she has an IUD in place.         Assessment and Plan:         Pregnancy test positive    - Beta HCG qual IFA urine - [facility] and [place]: positive   the patient has some concerns about having IUD in place. She is stable. [facility].                   Physical Exam:      /78  Pulse 88  Resp 16  Wt 235 lb (106.6 kg)  LMP 10/11/2017  SpO2 98%     Constitutional: Patient is in no distress The patient is pleasant and cooperative.   HEENT: Head:  Head is atraumatic, normocephalic.    Eyes: Pupils are equal, round and reactive to light and accomodation.  Sclera is non-icteric. No conjunctival injection, or exudate noted. Extraocular motion is intact. Visual acuity is intact bilaterally.  Ears:  External acoustic canals are patent and clear.  There is no erythema and bulging( exudate)  of the ( R/L ) tympanic membrane(s ).   Nose:  No Nasal congestion w/o drainage or mucosal ulceration is noted.  Throat:  Oral mucosa is moist.  No oral lesions are noted.  No posterior pharyngeal hyperemia or exudate noted.     Neck Supple.  There is no cervical lymphadenopathy.  No nuchal rigidity noted.  There is no meningismus.     Cardiovascular: Heart is regular to rate and rhythm.  No murmur is noted.     Chest. Chest Symmetrical, no soft tissues, swelling, or tenderness upon palpation   Lungs: Clear in the anterior and posterior pulmonary fields.   Abdomen: Soft and non-tender.    Back No flank tenderness is noted.   Extremeties No edema, no calf tenderness.   Neuro: No focal deficit.   Skin No petechiae or purpura is noted.  There is no rash.   Mood Normal               Medications:        PRN Meds:          Data:      All new lab and imaging data was reviewed.   Results for orders placed or performed in visit on 11/25/17   Beta HCG qual IFA urine - [facility] and [place]   Result Value Ref Range    Beta HCG Qual IFA Urine Positive (A) NEG^Negative

## 2017-12-29 ENCOUNTER — RADIANT APPOINTMENT (OUTPATIENT)
Dept: ULTRASOUND IMAGING | Facility: CLINIC | Age: 32
End: 2017-12-29
Payer: COMMERCIAL

## 2017-12-29 ENCOUNTER — PRENATAL OFFICE VISIT (OUTPATIENT)
Dept: MIDWIFE SERVICES | Facility: CLINIC | Age: 32
End: 2017-12-29
Payer: COMMERCIAL

## 2017-12-29 VITALS — WEIGHT: 242 LBS | DIASTOLIC BLOOD PRESSURE: 76 MMHG | SYSTOLIC BLOOD PRESSURE: 120 MMHG | BODY MASS INDEX: 39.06 KG/M2

## 2017-12-29 DIAGNOSIS — O99.211 OBESITY AFFECTING PREGNANCY IN FIRST TRIMESTER: ICD-10-CM

## 2017-12-29 DIAGNOSIS — Z34.91 UNCERTAIN DATES, ANTEPARTUM, FIRST TRIMESTER: ICD-10-CM

## 2017-12-29 DIAGNOSIS — O09.91 SUPERVISION OF HIGH RISK PREGNANCY IN FIRST TRIMESTER: Primary | ICD-10-CM

## 2017-12-29 DIAGNOSIS — Z11.3 SCREENING FOR GONORRHEA: ICD-10-CM

## 2017-12-29 DIAGNOSIS — Z3A.10 10 WEEKS GESTATION OF PREGNANCY: ICD-10-CM

## 2017-12-29 DIAGNOSIS — Z11.8 SCREENING FOR CHLAMYDIAL DISEASE: ICD-10-CM

## 2017-12-29 DIAGNOSIS — Z23 NEED FOR TDAP VACCINATION: ICD-10-CM

## 2017-12-29 DIAGNOSIS — Z13.79 GENETIC SCREENING: ICD-10-CM

## 2017-12-29 PROCEDURE — 87491 CHLMYD TRACH DNA AMP PROBE: CPT | Performed by: ADVANCED PRACTICE MIDWIFE

## 2017-12-29 PROCEDURE — 87591 N.GONORRHOEAE DNA AMP PROB: CPT | Performed by: ADVANCED PRACTICE MIDWIFE

## 2017-12-29 PROCEDURE — 99207 ZZC PRENATAL VISIT: CPT | Performed by: ADVANCED PRACTICE MIDWIFE

## 2017-12-29 PROCEDURE — 76801 OB US < 14 WKS SINGLE FETUS: CPT | Performed by: OBSTETRICS & GYNECOLOGY

## 2017-12-29 NOTE — MR AVS SNAPSHOT
After Visit Summary   12/29/2017    Elizabeth Crooks    MRN: [id number]           Patient Information     Date Of Birth          1985        Visit Information        Provider Department      12/29/2017 8:50 AM Chico Hussein APRN CNM [facility]        Today's Diagnoses     Supervision of high risk pregnancy in first trimester    -  1    Uncertain dates, antepartum, first trimester        Genetic screening        Screening for gonorrhea        Screening for chlamydial disease        10 weeks gestation of pregnancy        Obesity affecting pregnancy in first trimester        Need for Tdap vaccination          Care Instructions    Remedies for nausea and vomiting with pregnancy      Eat small frequent meals every 2-3 hours if possible.       Avoid food at extremes of temperature and drinks with carbonation.      Eat foods that appeal to you, avoiding fats and spicy foods.      Avoid liquids with foods.  Drink liquids 30-60 minutes before or after eating and sip slowly.      Bread, pasta, crackers, potatoes, and rice tend to be tolerated the best.      Don't worry about what you eat in the first 3 months, it is more important that you can eat and keep it down.       Try flat ginger ale or ginger tea      Before rising in the morning, eat a small amount of crackers or dry toast.      Peppermint tea      Ginger is a herbal remedy for nausea and you can use it in any form.  There are ginger tablets you can purchase.  The dose 1000 mg a day in divided doses.       You may also try doxylamine (Unisom) 12.5 mg three times a day which is a sleeping medication along with Vitamin B6 25 mg three times a day.  This combination takes up to a week to work so give it some time.       Benadryl (diphenhydramine) 25-50 mg every 8 hour or Dramamine (dimenhydrinate)  mg by mouth every 4-6 hours. Both of these medication may cause some drowsiness      Other things that may help  include an Accupressure band and acupuncture      If these methods fail there are many prescriptions that we can try    If you begin to vomit more than 5 or 6 times a day and feel that you are unable to keep anything down, call the [facility] at [phone]    Recommendations for total and rate of weight gain during pregnancy, by prepregnancy BMI    Total weight gain Rates of weight gain*  2nd and 3rd trimester   Prepregnancy BMI Range in kg Range in lbs Mean (range) in kg/week Mean (range) in lbs/week   Underweight (<18.5 kg/m2) 12.5-18 28-40 0.51 (0.44-0.58) 1 (1-1.3)   Normal weight (18.5-24.9 kg/m2) 11.5-16 25-35 0.42 (0.35-0.50) 1 (0.8-1)   Overweight (25.0-29.9 kg/m2) 7-11.5 15-25 0.28 (0.23-0.33) 0.6 (0.5-0.7)   Obese (?30.0 kg/m2) 5-9 11-20 0.22 (0.17-0.27) 0.5 (0.4-0.6)   BMI: body mass index.  * Calculations assume a 0.5-2 kg (1.1-4.4 lbs) weight gain in the first trimester.  * To calculate BMI go to [email or web address]/                Follow-ups after your visit        Follow-up notes from your care team     Return in about 4 weeks (around 1/26/2018).      Your next 10 appointments already scheduled     Jan 29, 2018  8:00 AM CST   ESTABLISHED PRENATAL with SINCERE Hanson CNM   [facility] ([facility])    [address] [place] [address] [id number] [phone]              Future tests that were ordered for you today     Open Future Orders        Priority Expected Expires Ordered    Non Invasive Prenatal Test Cell Free DNA Routine 1/29/2018 12/29/2018 12/29/2017            Who to contact     If you have questions or need follow up information about today's clinic visit or your schedule please contact [facility] directly at [phone].  Normal or non-critical lab and imaging results will be communicated to you by MyChart, letter or phone within 4 business days after the clinic has received  "the results. If you do not hear from us within 7 days, please contact the clinic through [employer] [facility] or phone. If you have a critical or abnormal lab result, we will notify you by phone as soon as possible.  Submit refill requests through [employer] or call your pharmacy and they will forward the refill request to us. Please allow 3 business days for your refill to be completed.          Additional Information About Your Visit        [employer] Information     [employer] lets you send messages to your doctor, view your test results, renew your prescriptions, schedule appointments and more. To sign up, go to [email or web address] . Click on \"Log in\" on the left side of the screen, which will take you to the Welcome page. Then click on \"Sign up Now\" on the right side of the page.     You will be asked to enter the access code listed below, as well as some personal information. Please follow the directions to create your username and password.     Your access code is: [email or web address]  Expires: 2018 11:44 AM     Your access code will  in 90 days. If you need help or a new code, please call your [facility] clinic or [phone].        Care EveryWhere ID     This is your Care EveryWhere ID. This could be used by other organizations to access your [facility] medical records  [id number]        Your Vitals Were     Last Period Breastfeeding? BMI (Body Mass Index)             10/11/2017 No 39.06 kg/m2          Blood Pressure from Last 3 Encounters:   17 120/76   17 112/76   17 141/79    Weight from Last 3 Encounters:   17 242 lb (109.8 kg)   17 237 lb 12.8 oz (107.9 kg)   17 230 lb (104.3 kg)              We Performed the Following     Chlamydia trachomatis PCR     Neisseria gonorrhoeae PCR     US OB < 14 Weeks Single        Primary Care Provider    Provider Outside       No address on file        Equal Access to Services     KYLE ROCHA AH: Hadii donna Silva, " yadira lawton bharaticarlos inés sullivan ah. So [facility] [phone].    ATENCIÓN: Si zac younger, tiene a mcrae disposición servicios gratuitos de asistencia lingüística. Fernanda al [phone].    We comply with applicable federal civil rights laws and [place] laws. We do not discriminate on the basis of race, color, national origin, age, disability, sex, sexual orientation, or gender identity.            Thank you!     Thank you for choosing [facility]  for your care. Our goal is always to provide you with excellent care. Hearing back from our patients is one way we can continue to improve our services. Please take a few minutes to complete the written survey that you may receive in the mail after your visit with us. Thank you!             Your Updated Medication List - Protect others around you: Learn how to safely use, store and throw away your medicines at www.disposemymeds.org.          This list is accurate as of: 12/29/17 11:19 AM.  Always use your most recent med list.                   Brand Name Dispense Instructions for use Diagnosis    prenatal multivitamin plus iron 27-0.8 MG Tabs per tablet      Take 1 tablet by mouth daily    Supervision of high risk pregnancy in first trimester       SYNTHROID PO       Supervision of high risk pregnancy in first trimester       VITAMIN D-3 PO      Take 4,000 Units by mouth

## 2017-12-29 NOTE — PATIENT INSTRUCTIONS
Remedies for nausea and vomiting with pregnancy      Eat small frequent meals every 2-3 hours if possible.       Avoid food at extremes of temperature and drinks with carbonation.      Eat foods that appeal to you, avoiding fats and spicy foods.      Avoid liquids with foods.  Drink liquids 30-60 minutes before or after eating and sip slowly.      Bread, pasta, crackers, potatoes, and rice tend to be tolerated the best.      Don't worry about what you eat in the first 3 months, it is more important that you can eat and keep it down.       Try flat ginger ale or ginger tea      Before rising in the morning, eat a small amount of crackers or dry toast.      Peppermint tea      Ginger is a herbal remedy for nausea and you can use it in any form.  There are ginger tablets you can purchase.  The dose 1000 mg a day in divided doses.       You may also try doxylamine (Unisom) 12.5 mg three times a day which is a sleeping medication along with Vitamin B6 25 mg three times a day.  This combination takes up to a week to work so give it some time.       Benadryl (diphenhydramine) 25-50 mg every 8 hour or Dramamine (dimenhydrinate)  mg by mouth every 4-6 hours. Both of these medication may cause some drowsiness      Other things that may help include an Accupressure band and acupuncture      If these methods fail there are many prescriptions that we can try    If you begin to vomit more than 5 or 6 times a day and feel that you are unable to keep anything down, call the [facility] at [phone]    Recommendations for total and rate of weight gain during pregnancy, by prepregnancy BMI    Total weight gain Rates of weight gain*  2nd and 3rd trimester   Prepregnancy BMI Range in kg Range in lbs Mean (range) in kg/week Mean (range) in lbs/week   Underweight (<18.5 kg/m2) 12.5-18 28-40 0.51 (0.44-0.58) 1 (1-1.3)   Normal weight (18.5-24.9 kg/m2) 11.5-16 25-35 0.42 (0.35-0.50) 1 (0.8-1)   Overweight (25.0-29.9  kg/m2) 7-11.5 15-25 0.28 (0.23-0.33) 0.6 (0.5-0.7)   Obese (?30.0 kg/m2) 5-9 11-20 0.22 (0.17-0.27) 0.5 (0.4-0.6)   BMI: body mass index.  * Calculations assume a 0.5-2 kg (1.1-4.4 lbs) weight gain in the first trimester.  * To calculate BMI go to [email or web address]/bmi/

## 2017-12-29 NOTE — PROGRESS NOTES
Patient feels well. Here today with Will and her son.   Patient has had nausea and has not had vomiting. Nausea since resolved.  Educated about diet, exercise and normal weight gain  U/S done today to confirm KEVIN. Dating u/s KEVIN will be used: 7/27/18  Normal to feel movement between 18-22 weeks  GC/Chlamydia urine collection today  Discussed genetic screening; patient will have. Lab is closed on Monday and so genetic screening cannot be done today. It will be futured. She get the lab draw either  next week or at the next visit.  Release of records (to obtain previous gyn records) signed today.  Warning signs discussed  Return to clinic 4 weeks    Chico Hussein, SANDRA, APRN, CNM

## 2017-12-31 LAB
C TRACH DNA SPEC QL NAA+PROBE: NEGATIVE
N GONORRHOEA DNA SPEC QL NAA+PROBE: NEGATIVE
SPECIMEN SOURCE: NORMAL
SPECIMEN SOURCE: NORMAL

## 2018-01-03 ENCOUNTER — TRANSFERRED RECORDS (OUTPATIENT)
Dept: HEALTH INFORMATION MANAGEMENT | Facility: CLINIC | Age: 33
End: 2018-01-03

## 2018-01-03 DIAGNOSIS — O09.91 SUPERVISION OF HIGH RISK PREGNANCY IN FIRST TRIMESTER: ICD-10-CM

## 2018-01-03 LAB — MISCELLANEOUS TEST: NORMAL

## 2018-01-03 PROCEDURE — 99000 SPECIMEN HANDLING OFFICE-LAB: CPT | Performed by: ADVANCED PRACTICE MIDWIFE

## 2018-01-03 PROCEDURE — 40000791 ZZHCL STATISTIC VERIFI PRENATAL TRISOMY 21,18,13: Mod: 90 | Performed by: ADVANCED PRACTICE MIDWIFE

## 2018-01-03 PROCEDURE — 36415 COLL VENOUS BLD VENIPUNCTURE: CPT | Performed by: ADVANCED PRACTICE MIDWIFE

## 2018-01-18 ENCOUNTER — TELEPHONE (OUTPATIENT)
Dept: NURSING | Facility: CLINIC | Age: 33
End: 2018-01-18

## 2018-01-18 LAB
LOCATION PERFORMED: NORMAL
NORMAL RANGE FOR SEND OUTS MISC TEST: NORMAL
RESULT: NORMAL
SEND OUTS MISC TEST CODE: NORMAL
SEND OUTS MISC TEST SPECIMEN: NORMAL
TEST NAME: NORMAL

## 2018-01-18 NOTE — TELEPHONE ENCOUNTER
Innatal results: negative    Test    Result     Interpretation  Chromosome 21  No aneuploidy detected     Results consistent with two copies of chromosome 21  Chromosome 18  No aneuploidy detected  Results consistent with two copies of chromosome 18  Chromosome 13  No aneuploidy detected  Results consistent with two copies of chromosome 13  Sex Chromosome  No aneuploidy detected  Results consistent with two sex chromosomes Pt does not want to know gender!     Carrier Testing:   Cystic Fibrosis: Not a Carrier   Fragile X Syndrome: Not a Carrier   Spinal Muscular Atrophy: Not a Carrier   Manny Sachs: Not a Carrier    Hemoglobinopathy Evaluation: Not Suggestive of Hemoglobinopathy    Pt informed, did not want to know gender. Routing to Chico Hussein as an FYI.

## 2018-01-19 LAB — NON INVASIVE PRENATAL TEST CELL FREE DNA: NORMAL

## 2018-01-29 ENCOUNTER — PRENATAL OFFICE VISIT (OUTPATIENT)
Dept: MIDWIFE SERVICES | Facility: CLINIC | Age: 33
End: 2018-01-29
Payer: COMMERCIAL

## 2018-01-29 VITALS — DIASTOLIC BLOOD PRESSURE: 74 MMHG | SYSTOLIC BLOOD PRESSURE: 118 MMHG | WEIGHT: 243 LBS | BODY MASS INDEX: 39.22 KG/M2

## 2018-01-29 DIAGNOSIS — O99.211 OBESITY AFFECTING PREGNANCY IN FIRST TRIMESTER: ICD-10-CM

## 2018-01-29 DIAGNOSIS — O09.92 SUPERVISION OF HIGH RISK PREGNANCY IN SECOND TRIMESTER: Primary | ICD-10-CM

## 2018-01-29 DIAGNOSIS — Z3A.14 14 WEEKS GESTATION OF PREGNANCY: ICD-10-CM

## 2018-01-29 PROCEDURE — 99207 ZZC PRENATAL VISIT: CPT | Performed by: ADVANCED PRACTICE MIDWIFE

## 2018-01-29 NOTE — PROGRESS NOTES
Feels well, here with son, Luis Carlos.  No further nausea, or light headedness.  Fetal movement No  Denies loss of fluid/vb/contractions/pelvic pain  Depression screening done, feeling fine, no problems with depression in hx  Requested AFP today, will get at next appt., order futured.    Anatomy ultrasound next visit between 20-22 weeks,  M referral for BMI >35, if patient desires.  Will be 18 weeks at next appt, order U/S then for 22 weeks  Return to clinic 4 weeks    SINCERE Leos, CNM

## 2018-01-29 NOTE — MR AVS SNAPSHOT
After Visit Summary   1/29/2018    Elizabeth Crooks    MRN: [id number]           Patient Information     Date Of Birth          1985        Visit Information        Provider Department      1/29/2018 8:00 AM Paula West APRN CNM [facility] [place]        Today's Diagnoses     Supervision of high risk pregnancy in second trimester    -  1    Obesity affecting pregnancy in first trimester        BMI 36.0-36.9,adult        14 weeks gestation of pregnancy           Follow-ups after your visit        Follow-up notes from your care team     Return in about 4 weeks (around 2/26/2018) for Routine Prenatal Visit, Lab Work.      Your next 10 appointments already scheduled     Feb 27, 2018  8:00 AM CST   ESTABLISHED PRENATAL with SINCERE Abdi CNM   [facility] [place] ([facility])    [address] [place] [id number] [phone]              Who to contact     If you have questions or need follow up information about today's clinic visit or your schedule please contact [facility] [place] directly at [phone].  Normal or non-critical lab and imaging results will be communicated to you by MyChart, letter or phone within 4 business days after the clinic has received the results. If you do not hear from us within 7 days, please contact the clinic through Startlocal[employer] or phone. If you have a critical or abnormal lab result, we will notify you by phone as soon as possible.  Submit refill requests through S[employer]nikBot or call your pharmacy and they will forward the refill request to us. Please allow 3 business days for your refill to be completed.          Additional Information About Your Visit        MyChart Information     SputnikBot lets you send messages to your doctor, view your test results, renew your prescriptions, schedule appointments and more. To sign up, go to [email or web address] . Click on  "\"Log in\" on the left side of the screen, which will take you to the Welcome page. Then click on \"Sign up Now\" on the right side of the page.     You will be asked to enter the access code listed below, as well as some personal information. Please follow the directions to create your username and password.     Your access code is: [email or web address]  Expires: 2018 11:44 AM     Your access code will  in 90 days. If you need help or a new code, please call your [place] clinic or [phone].        Care EveryWhere ID     This is your Care EveryWhere ID. This could be used by other organizations to access your [place] medical records  [id number]        Your Vitals Were     Last Period Breastfeeding? BMI (Body Mass Index)             10/11/2017 No 39.22 kg/m2          Blood Pressure from Last 3 Encounters:   18 118/74   17 120/76   17 112/76    Weight from Last 3 Encounters:   18 243 lb (110.2 kg)   17 242 lb (109.8 kg)   17 237 lb 12.8 oz (107.9 kg)              Today, you had the following     No orders found for display       Primary Care Provider    Provider Outside       No address on file        Equal Access to Services     KYLE ROCHA : Hadii alejandrina ku ayshao Soran, waaxda luqadaha, qaybta kaalmada adeegyada, yadira sullivan . So [facility] [phone].    ATENCIÓN: Si habla español, tiene a mcrae disposición servicios gratuitos de asistencia lingüística. Llame al [phone].    We comply with applicable federal civil rights laws and [place] laws. We do not discriminate on the basis of race, color, national origin, age, disability, sex, sexual orientation, or gender identity.            Thank you!     Thank you for choosing [facility] [place]  for your care. Our goal is always to provide you with excellent care. Hearing back from our patients is one way we can continue to improve our services. Please take a few minutes to complete the " written survey that you may receive in the mail after your visit with us. Thank you!             Your Updated Medication List - Protect others around you: Learn how to safely use, store and throw away your medicines at www.disposemymeds.org.          This list is accurate as of 1/29/18  8:32 AM.  Always use your most recent med list.                   Brand Name Dispense Instructions for use Diagnosis    prenatal multivitamin plus iron 27-0.8 MG Tabs per tablet      Take 1 tablet by mouth daily    Supervision of high risk pregnancy in first trimester       SYNTHROID PO       Supervision of high risk pregnancy in first trimester       VITAMIN D-3 PO      Take 4,000 Units by mouth

## 2018-02-12 ENCOUNTER — MYC MEDICAL ADVICE (OUTPATIENT)
Dept: MIDWIFE SERVICES | Facility: CLINIC | Age: 33
End: 2018-02-12

## 2018-02-12 DIAGNOSIS — O09.91 SUPERVISION OF HIGH RISK PREGNANCY IN FIRST TRIMESTER: ICD-10-CM

## 2018-02-12 PROBLEM — E03.9 HYPOTHYROIDISM AFFECTING PREGNANCY, ANTEPARTUM: Status: ACTIVE | Noted: 2018-02-12

## 2018-02-12 PROBLEM — O99.280 HYPOTHYROIDISM AFFECTING PREGNANCY, ANTEPARTUM: Status: ACTIVE | Noted: 2018-02-12

## 2018-02-12 RX ORDER — LEVOTHYROXINE SODIUM 50 UG/1
50 TABLET ORAL DAILY
Qty: 90 TABLET | Refills: 1 | Status: SHIPPED | OUTPATIENT
Start: 2018-02-12 | End: 2018-09-17

## 2018-02-12 NOTE — TELEPHONE ENCOUNTER
Rx sent to pharmacy.  I would recommend establishing care with a primary care provider or endocrinologist for long-term management of thyroid condition; we can send a referral if needed.    Tash Pennington, DNP, APRN, CNM

## 2018-02-12 NOTE — TELEPHONE ENCOUNTER
16w3d; KEVIN: 7/27/2018  Pt sent [employer] message below. Upon review in Epic am not seeing in pt's pregnancy episode any mention of whom is filling her Levothyroxine Rx or follow-up following her NOB thyroid lab that was done at our clinic. Routing to midwife pool to review and advise.

## 2018-02-27 ENCOUNTER — PRENATAL OFFICE VISIT (OUTPATIENT)
Dept: MIDWIFE SERVICES | Facility: CLINIC | Age: 33
End: 2018-02-27
Payer: COMMERCIAL

## 2018-02-27 VITALS — BODY MASS INDEX: 40.35 KG/M2 | WEIGHT: 250 LBS | DIASTOLIC BLOOD PRESSURE: 76 MMHG | SYSTOLIC BLOOD PRESSURE: 114 MMHG

## 2018-02-27 DIAGNOSIS — Z3A.18 18 WEEKS GESTATION OF PREGNANCY: ICD-10-CM

## 2018-02-27 DIAGNOSIS — O09.92 SUPERVISION OF HIGH RISK PREGNANCY IN SECOND TRIMESTER: Primary | ICD-10-CM

## 2018-02-27 DIAGNOSIS — O99.212 OBESITY AFFECTING PREGNANCY IN SECOND TRIMESTER: ICD-10-CM

## 2018-02-27 PROBLEM — Z34.92 UNCERTAIN DATES, ANTEPARTUM, SECOND TRIMESTER: Status: ACTIVE | Noted: 2017-12-13

## 2018-02-27 PROCEDURE — 99207 ZZC PRENATAL VISIT: CPT | Performed by: ADVANCED PRACTICE MIDWIFE

## 2018-02-27 PROCEDURE — 99000 SPECIMEN HANDLING OFFICE-LAB: CPT | Performed by: ADVANCED PRACTICE MIDWIFE

## 2018-02-27 PROCEDURE — 82105 ALPHA-FETOPROTEIN SERUM: CPT | Mod: 90 | Performed by: ADVANCED PRACTICE MIDWIFE

## 2018-02-27 PROCEDURE — 36415 COLL VENOUS BLD VENIPUNCTURE: CPT | Performed by: ADVANCED PRACTICE MIDWIFE

## 2018-02-27 NOTE — PATIENT INSTRUCTIONS
Constipation    Constipation can be caused by many factors such as poor diet, lack of activity, and medications. Often times women experience more constipation during pregnancy due to decreased intestinal motility.    DRINK TONS OF WATER! 2.5 liters (4 pints) of water per day      Activity is very important. Increase your daily activity to at least 20-60 minutes. This increases blood flow to your gut and improves bowel function    Limit caffeine and alcohol intake    Avoid foods you've identified as constipating    Increase fiber intake: there are ways to increase you fiber through your diet but there are also OTC agents such as Metamucil or Benfiber that you can supplement your diet with    Use probiotics such as Florastor and/or prebiotics such as oligosaccharides    Consider using an Omega 3 supplement, flaxseed and carla seeds are great sources    Plan adequate time for elimination, it is most effective right after activity, and it may be beneficial to plan a consistent time daily    Food Suggestions      Eat beans, nuts, whole grain breads and cereals, oats, barley, figs, apples with skin, raisins, green leafy vegetables, fresh and dried fruits (especially grapes), prunes or prune juice    Eat popcorn nightly    Eat 2-3 salads per day    Add a pinch of cayenne pepper to food    1-2 tbsp of olive oil per day    Lemon juice and/or honey in warm water every morning before breakfast    Decrease red meat, refined white flour products like white rice, white bread and pasta    Tea infusions of: rosemary, chamomile, lemon balm, senna leaf, alfalfa, fennel seeds, lavender, cascara, dandelion, licorice root tea, psyllium seeds, marshmallow root    Other remedies      Increase Vitamin B and E (800 IU if not pregnant, 400 IU if pregnant per day) and potassium, calcium, and magnesium supplements      If these remedies fail, medications are an option as well. Please talk with your midwife if you continue to struggle with  constipation. If you are pregnant please double check with us before starting any medications!      A high fiber diet with plenty of fluids (up to 8 glasses of water daily) is suggested to relieve these symptoms.  Metamucil, 1 tablespoon once or twice daily can be used to keep bowels regular if needed. Citrucel daily is useful as well and causes less gas.

## 2018-02-27 NOTE — PROGRESS NOTES
Feels well overall. Here today with Will and her son.   Fetal movement Yes  Denies loss of fluid/vb/contractions/pelvic pain  FHR seen via bedside ultrasound due to only finding placental heart rate via doptone.   AFP drawn today  Anatomy ultrasound next visit between 20-22 weeks - offered MFM u/s. Client declined and will come here for u/s.  Return to clinic 4 weeks    Chico Hussein, SANDRA, APRN, CNM

## 2018-02-27 NOTE — MR AVS SNAPSHOT
After Visit Summary   2/27/2018    Elizabeth Crooks    MRN: [id number]           Patient Information     Date Of Birth          1985        Visit Information        Provider Department      2/27/2018 8:00 AM Chico Hussein APRN CNM [facility]        Today's Diagnoses     Supervision of high risk pregnancy in second trimester    -  1    Uncertain dates, antepartum, second trimester        Need for Tdap vaccination        Obesity affecting pregnancy in second trimester          Care Instructions    Constipation    Constipation can be caused by many factors such as poor diet, lack of activity, and medications. Often times women experience more constipation during pregnancy due to decreased intestinal motility.    DRINK TONS OF WATER! 2.5 liters (4 pints) of water per day      Activity is very important. Increase your daily activity to at least 20-60 minutes. This increases blood flow to your gut and improves bowel function    Limit caffeine and alcohol intake    Avoid foods you've identified as constipating    Increase fiber intake: there are ways to increase you fiber through your diet but there are also OTC agents such as Metamucil or Benfiber that you can supplement your diet with    Use probiotics such as Florastor and/or prebiotics such as oligosaccharides    Consider using an Omega 3 supplement, flaxseed and carla seeds are great sources    Plan adequate time for elimination, it is most effective right after activity, and it may be beneficial to plan a consistent time daily    Food Suggestions      Eat beans, nuts, whole grain breads and cereals, oats, barley, figs, apples with skin, raisins, green leafy vegetables, fresh and dried fruits (especially grapes), prunes or prune juice    Eat popcorn nightly    Eat 2-3 salads per day    Add a pinch of cayenne pepper to food    1-2 tbsp of olive oil per day    Lemon juice and/or honey in warm water every morning before  breakfast    Decrease red meat, refined white flour products like white rice, white bread and pasta    Tea infusions of: rosemary, chamomile, lemon balm, senna leaf, alfalfa, fennel seeds, lavender, cascara, dandelion, licorice root tea, psyllium seeds, marshmallow root    Other remedies      Increase Vitamin B and E (800 IU if not pregnant, 400 IU if pregnant per day) and potassium, calcium, and magnesium supplements      If these remedies fail, medications are an option as well. Please talk with your midwife if you continue to struggle with constipation. If you are pregnant please double check with us before starting any medications!      A high fiber diet with plenty of fluids (up to 8 glasses of water daily) is suggested to relieve these symptoms.  Metamucil, 1 tablespoon once or twice daily can be used to keep bowels regular if needed. Citrucel daily is useful as well and causes less gas.              Follow-ups after your visit        Follow-up notes from your care team     Return in about 4 weeks (around 3/27/2018) for Prenatal Visit, Ultrasound.      Your next 10 appointments already scheduled     Mar 14, 2018 10:40 AM CDT   US PELVIC COMPLETE W TRANSVAGINAL with WEUS1   [facility] [place] ([facility] [place])    [address] [place] [id number] [phone]           Please bring a list of your medicines (including vitamins, minerals and over-the-counter drugs). Also, tell your doctor about any allergies you may have. Wear comfortable clothes and leave your valuables at home.  Adults: Drink six 8-ounce glasses of fluid one hour before your exam. Do NOT empty your bladder.  If you need to empty your bladder before your exam, try to release only a little bit of urine. Then, drink another 8oz glass of fluid.  Children: Children who are potty trained should drink at least 4 cups (32 oz) of liquid 45 minutes to one hour prior to the exam. The child s bladder  must be full in order to achieve a diagnostic exam. If your child is very uncomfortable or has an urgent need to pee, please notify a technologist; they will try to find out how much longer the wait may be and provide instructions to help relieve the pressure. Occasionally it is medically necessary to insert a urinary catheter to fill the bladder.  Please call the Imaging Department at your exam site with any questions.            Mar 14, 2018 11:20 AM CDT   ESTABLISHED PRENATAL with SINCERE Rodas CNM   [facility] [place] ([facility] [place])    [address]  [place] [address]   [phone]            Apr 10, 2018  9:00 AM CDT   ESTABLISHED PRENATAL with SINCERE Krishnan CNM   [facility] [place] ([facility] [place])    [address]  [place] [address]   [phone]              Future tests that were ordered for you today     Open Future Orders        Priority Expected Expires Ordered    US OB > 14 Weeks Complete Single Routine 3/27/2018 2/27/2019 2/27/2018            Who to contact     If you have questions or need follow up information about today's clinic visit or your schedule please contact [facility] [place] directly at [phone].  Normal or non-critical lab and imaging results will be communicated to you by MyChart, letter or phone within 4 business days after the clinic has received the results. If you do not hear from us within 7 days, please contact the clinic through MyChart or phone. If you have a critical or abnormal lab result, we will notify you by phone as soon as possible.  Submit refill requests through [email or web address] or call your pharmacy and they will forward the refill request to us. Please allow 3 business days for your refill to be completed.          Additional Information About Your Visit        [email or web address] Information     [email or web address] gives you secure access to your  electronic health record. If you see a primary care provider, you can also send messages to your care team and make appointments. If you have questions, please call your primary care clinic.  If you do not have a primary care provider, please call [phone] and they will assist you.        Care EveryWhere ID     This is your Care EveryWhere ID. This could be used by other organizations to access your [place] medical records  [id number]        Your Vitals Were     Last Period BMI (Body Mass Index)                10/11/2017 40.35 kg/m2           Blood Pressure from Last 3 Encounters:   02/27/18 114/76   01/29/18 118/74   12/29/17 120/76    Weight from Last 3 Encounters:   02/27/18 250 lb (113.4 kg)   01/29/18 243 lb (110.2 kg)   12/29/17 242 lb (109.8 kg)              We Performed the Following     Alpha fetoprotein maternal screen        Primary Care Provider    Provider Outside       No address on file        Equal Access to Services     KYLE ROCHA : Hadii alejandrina Chau, donna grayosn, qagideon aguirrealmareese whitman, yadira sullivan . So [facility] [phone].    ATENCIÓN: Si habla español, tiene a mcrae disposición servicios gratuitos de asistencia lingüística. Llame al [phone].    We comply with applicable federal civil rights laws and [place] laws. We do not discriminate on the basis of race, color, national origin, age, disability, sex, sexual orientation, or gender identity.            Thank you!     Thank you for choosing [facility] [place]  for your care. Our goal is always to provide you with excellent care. Hearing back from our patients is one way we can continue to improve our services. Please take a few minutes to complete the written survey that you may receive in the mail after your visit with us. Thank you!             Your Updated Medication List - Protect others around you: Learn how to safely use, store and throw away your medicines at  www.disposemymeds.org.          This list is accurate as of 2/27/18  9:04 AM.  Always use your most recent med list.                   Brand Name Dispense Instructions for use Diagnosis    levothyroxine 50 MCG tablet    SYNTHROID    90 tablet    Take 1 tablet (50 mcg) by mouth daily    Supervision of high risk pregnancy in first trimester       prenatal multivitamin plus iron 27-0.8 MG Tabs per tablet      Take 1 tablet by mouth daily    Supervision of high risk pregnancy in first trimester       VITAMIN D-3 PO      Take 4,000 Units by mouth

## 2018-03-06 LAB
# FETUSES US: NORMAL
AFP ADJ MOM AMN: NORMAL
AFP SERPL-MCNC: NORMAL NG/ML
AGE - REPORTED: NORMAL
DATING METHOD: NORMAL
DIABETIC AT CONCEPTION: NO
FAMILY MEMBER DISEASES HX: NO
FAMILY MEMBER DISEASES HX: NORMAL
GA METHOD: NORMAL
GA: NORMAL WK
HX OF HEREDITARY DISORDERS: NO
IDDM PATIENT QL: NORMAL
INTEGRATED SCN PATIENT-IMP: NORMAL
LMP START DATE: NORMAL
PREV HX CHROMOSOME ABNORMALITY: NORMAL
SPECIMEN DRAWN SERPL: NORMAL
TWINS: NO

## 2018-03-14 ENCOUNTER — PRENATAL OFFICE VISIT (OUTPATIENT)
Dept: MIDWIFE SERVICES | Facility: CLINIC | Age: 33
End: 2018-03-14
Payer: COMMERCIAL

## 2018-03-14 ENCOUNTER — RADIANT APPOINTMENT (OUTPATIENT)
Dept: ULTRASOUND IMAGING | Facility: CLINIC | Age: 33
End: 2018-03-14
Payer: COMMERCIAL

## 2018-03-14 VITALS — BODY MASS INDEX: 40.35 KG/M2 | WEIGHT: 250 LBS | DIASTOLIC BLOOD PRESSURE: 78 MMHG | SYSTOLIC BLOOD PRESSURE: 116 MMHG

## 2018-03-14 DIAGNOSIS — O99.212 OBESITY AFFECTING PREGNANCY IN SECOND TRIMESTER: ICD-10-CM

## 2018-03-14 DIAGNOSIS — O09.92 SUPERVISION OF HIGH RISK PREGNANCY IN SECOND TRIMESTER: ICD-10-CM

## 2018-03-14 DIAGNOSIS — Z3A.20 20 WEEKS GESTATION OF PREGNANCY: Primary | ICD-10-CM

## 2018-03-14 PROCEDURE — 76805 OB US >/= 14 WKS SNGL FETUS: CPT | Performed by: OBSTETRICS & GYNECOLOGY

## 2018-03-14 PROCEDURE — 99207 ZZC PRENATAL VISIT: CPT | Performed by: ADVANCED PRACTICE MIDWIFE

## 2018-03-14 NOTE — MR AVS SNAPSHOT
After Visit Summary   3/14/2018    Elizabeth Crooks    MRN: [id number]           Patient Information     Date Of Birth          1985        Visit Information        Provider Department      3/14/2018 11:20 AM Tabatha Ortega APRN [facility]M [facility]        Today's Diagnoses     20 weeks gestation of pregnancy    -  1    Obesity affecting pregnancy in second trimester        Supervision of high risk pregnancy in second trimester          Care Instructions    Round Ligament Pain    Pregnancy can entail many normal discomforts. One of those discomforts may be round ligament pain. Round ligament pain occurs as your uterus and your baby grow and the muscles begin to stretch more in your abdomen. Round ligament pain is normal and not dangerous but can be very uncomfortable      Round ligament pain is typically described as an unpleasant sensation that ranges from a sharp knifelike pain to dull intermittent pain in the lower abdominal/suprapubic area of a pregnant woman      Virtually all pregnant women will experience this pain at some point during their pregnancies      It typically manifests between 16 and 20 weeks of pregnancy and can be incredibly bothersome especially for women who remain very active during their pregnancies       Please discuss with your midwife if you are having this type of pain; sometimes the pain can be associated with other medical conditions so it is important for us to assess you just to make sure    Comfort measures    There are certain things you can do to cope with round ligament pain and ease the discomfort you are having      Pregnancy support belt      Tylenol      Hot/ice packs      Baths       Exercise such as yoga and swimming that help stretch muscles      Prenatal massage      Reflexology to waist and pelvic joints       Positioning such as side-lying and hands and knees, make sure your abdomen is  well supported with pillows while doing  different positions  Calcium Rich Foods    All premenopausal or women of child-bearing age need to get at least 1000 mg of calcium per day from diet and/or supplementation. Post menopausal women should get at least 1200 mg from diet and/or supplementation.    Food     Amount   Calcium (mg)  Dairy  Yogurt     1 cup   400   Ice Cream/Frozen yogurt 1/2 cup  100  Milk 1% or 2%   1 cup   300  Cheese   1 oz    195-335   Cottage cheese 2%  1 cup   155  Fruits  Orange   1 medium  60  Pear    1 medium  19  Raisins    1/4 cup  18  Vegetables-fresh and/or cooked  Broccoli   1/2 cup  36  Bok Jasen   1/2 cup  79  Leticia greens   1/2 cup  15  Carrots    1 medium  19  Iceberg lettuce  4 leaves  16  Nuts, Beans, Seeds  Canned baked beans   1/2 cup  100  Canned red kidney beans 1/2 cup  25-80  Canned navy beans  1/2 cup  64  Tofu with calcium sulfate  1/2 cup  150  Soybeans   1 cup   175  Soy milk    1 cup   10  Almonds    1/4 cup  74  Protein  [place]   3 oz   181  Tuna, canned   3 oz   10  Turkey breast   3.5 oz   10  Egg (large)   1 egg   27  Peanut Butter   2 tbsp   11  Beef     3 oz   9  Chicken   1 leg   15  Grain  Amaranth (uncooked)  1 cup   298  Corn tortilla    1 medium  42  Rice (cooked)   1/2 cup  20  Waffle (frozen ~7in)  1   179  Bagel    1   23  Calcium fortified foods/drinks  Orange juice   1 cup   300  Cereal + milk   3/4 cup + 1/2 cup 400  Rice milk   1 cup   300  Lactaid milk    1 cup               Follow-ups after your visit        Follow-up notes from your care team     Return in about 4 weeks (around 4/11/2018) for Prenatal Visit.      Your next 10 appointments already scheduled     Apr 10, 2018  9:00 AM RUBAT   ESTABLISHED PRENATAL with SINCERE Krishnan CNM   [facility] [place] ([facility] [place])    [address] [address] [phone]              Who to contact     If you have questions or need follow up information about today's clinic  visit or your schedule please contact [facility] [place] directly at [phone].  Normal or non-critical lab and imaging results will be communicated to you by MyChart, letter or phone within 4 business days after the clinic has received the results. If you do not hear from us within 7 days, please contact the clinic through E[employer] Bioscience[employer] or phone. If you have a critical or abnormal lab result, we will notify you by phone as soon as possible.  Submit refill requests through [employer] or call your pharmacy and they will forward the refill request to us. Please allow 3 business days for your refill to be completed.          Additional Information About Your Visit        [employer] Information     [employer] gives you secure access to your electronic health record. If you see a primary care provider, you can also send messages to your care team and make appointments. If you have questions, please call your primary care clinic.  If you do not have a primary care provider, please call [phone] and they will assist you.        Care EveryWhere ID     This is your Care EveryWhere ID. This could be used by other organizations to access your [place] medical records  [id number]        Your Vitals Were     Last Period BMI (Body Mass Index)                10/11/2017 40.35 kg/m2           Blood Pressure from Last 3 Encounters:   03/14/18 116/78   02/27/18 114/76   01/29/18 118/74    Weight from Last 3 Encounters:   03/14/18 250 lb (113.4 kg)   02/27/18 250 lb (113.4 kg)   01/29/18 243 lb (110.2 kg)              Today, you had the following     No orders found for display       Primary Care Provider    Provider Outside       No address on file        Equal Access to Services     [facility]: Hadii alejandrina Chau, waaxda luqadaha, qaybta kaalmayadira scott . So [facility] [phone].    ATENCIÓN: Si habla español, tiene a mcrae disposición servicios gratuitos de asistencia  lingüística. Fernanda al [phone].    We comply with applicable federal civil rights laws and [place] laws. We do not discriminate on the basis of race, color, national origin, age, disability, sex, sexual orientation, or gender identity.            Thank you!     Thank you for choosing [facility] [place]  for your care. Our goal is always to provide you with excellent care. Hearing back from our patients is one way we can continue to improve our services. Please take a few minutes to complete the written survey that you may receive in the mail after your visit with us. Thank you!             Your Updated Medication List - Protect others around you: Learn how to safely use, store and throw away your medicines at www.disposemymeds.org.          This list is accurate as of 3/14/18 11:52 AM.  Always use your most recent med list.                   Brand Name Dispense Instructions for use Diagnosis    levothyroxine 50 MCG tablet    SYNTHROID    90 tablet    Take 1 tablet (50 mcg) by mouth daily    Supervision of high risk pregnancy in first trimester       prenatal multivitamin plus iron 27-0.8 MG Tabs per tablet      Take 1 tablet by mouth daily    Supervision of high risk pregnancy in first trimester       VITAMIN D-3 PO      Take 4,000 Units by mouth

## 2018-03-14 NOTE — PROGRESS NOTES
Feels well  Fetal movement: positive    Denies loss of fluid/vb/contractions  Anatomy ultrasound results discussed; to be reviewed by Dr. Lili PALUMBO, having a Unknown, Placenta:  Posterior Fundus  MTM handout provided    Return to clinic 4 weeks    Tabatha POST CNM

## 2018-03-14 NOTE — PATIENT INSTRUCTIONS
Round Ligament Pain    Pregnancy can entail many normal discomforts. One of those discomforts may be round ligament pain. Round ligament pain occurs as your uterus and your baby grow and the muscles begin to stretch more in your abdomen. Round ligament pain is normal and not dangerous but can be very uncomfortable      Round ligament pain is typically described as an unpleasant sensation that ranges from a sharp knifelike pain to dull intermittent pain in the lower abdominal/suprapubic area of a pregnant woman      Virtually all pregnant women will experience this pain at some point during their pregnancies      It typically manifests between 16 and 20 weeks of pregnancy and can be incredibly bothersome especially for women who remain very active during their pregnancies       Please discuss with your midwife if you are having this type of pain; sometimes the pain can be associated with other medical conditions so it is important for us to assess you just to make sure    Comfort measures    There are certain things you can do to cope with round ligament pain and ease the discomfort you are having      Pregnancy support belt      Tylenol      Hot/ice packs      Baths       Exercise such as yoga and swimming that help stretch muscles      Prenatal massage      Reflexology to waist and pelvic joints       Positioning such as side-lying and hands and knees, make sure your abdomen is  well supported with pillows while doing different positions  Calcium Rich Foods    All premenopausal or women of child-bearing age need to get at least 1000 mg of calcium per day from diet and/or supplementation. Post menopausal women should get at least 1200 mg from diet and/or supplementation.    Food     Amount   Calcium (mg)  Dairy  Yogurt     1 cup   400   Ice Cream/Frozen yogurt 1/2 cup  100  Milk 1% or 2%   1 cup   300  Cheese   1 oz    195-335   Cottage cheese 2%  1 cup   155  Fruits  Orange   1 medium  60  Pear    1  medium  19  Raisins    1/4 cup  18  Vegetables-fresh and/or cooked  Broccoli   1/2 cup  36  Bok Jasen   1/2 cup  79  Leticia greens   1/2 cup  15  Carrots    1 medium  19  Iceberg lettuce  4 leaves  16  Nuts, Beans, Seeds  Canned baked beans   1/2 cup  100  Canned red kidney beans 1/2 cup  25-80  Canned navy beans  1/2 cup  64  Tofu with calcium sulfate  1/2 cup  150  Soybeans   1 cup   175  Soy milk    1 cup   10  Almonds    1/4 cup  74  Protein  [place]   3 oz   181  Tuna, canned   3 oz   10  Turkey breast   3.5 oz   10  Egg (large)   1 egg   27  Peanut Butter   2 tbsp   11  Beef     3 oz   9  Chicken   1 leg   15  Grain  Amaranth (uncooked)  1 cup   298  Corn tortilla    1 medium  42  Rice (cooked)   1/2 cup  20  Waffle (frozen ~7in)  1   179  Bagel    1   23  Calcium fortified foods/drinks  Orange juice   1 cup   300  Cereal + milk   3/4 cup + 1/2 cup 400  Rice milk   1 cup   300  Lactaid milk    1 cup

## 2018-03-15 ENCOUNTER — MYC MEDICAL ADVICE (OUTPATIENT)
Dept: MIDWIFE SERVICES | Facility: CLINIC | Age: 33
End: 2018-03-15

## 2018-03-15 ENCOUNTER — TELEPHONE (OUTPATIENT)
Dept: MIDWIFE SERVICES | Facility: CLINIC | Age: 33
End: 2018-03-15

## 2018-03-15 DIAGNOSIS — O09.92 SUPERVISION OF HIGH RISK PREGNANCY IN SECOND TRIMESTER: Primary | ICD-10-CM

## 2018-03-15 NOTE — TELEPHONE ENCOUNTER
20w6d; KEVIN: 7/27/2018  Pt sent Hemoteqhart message below. Chico Hussein CNM contacted pt today via telephone (3/15/2018 Care Coordination). Talked with Chico Hussein CNM and she indicated that writer can send pt's Hemoteqhart message below to her directly. Routing to Chico Hussein CNM.

## 2018-03-15 NOTE — PROGRESS NOTES
Lyn Johnson,    Your anatomy ultrasound looked great. The only problem was that we were not able to see every part of your baby as well as we normally do. The MD who read your ultrasound stated that we could possibly repeat the ultrasound in 4 weeks. I am leaving this decision completely up to you. If you choose to have the ultrasound, then just call and schedule it for before your next prenatal visit. Due to your prepregnancy BMI we want to do a growth ultrasound at 32 weeks. If you have any questions or concerns, please MyChart message or call us.    Thank you,  Chico Hussein, SANDRA, APRN, CNM

## 2018-03-16 ENCOUNTER — MYC MEDICAL ADVICE (OUTPATIENT)
Dept: MIDWIFE SERVICES | Facility: CLINIC | Age: 33
End: 2018-03-16

## 2018-03-18 ENCOUNTER — TELEPHONE (OUTPATIENT)
Dept: OBGYN | Facility: CLINIC | Age: 33
End: 2018-03-18

## 2018-03-18 ENCOUNTER — NURSE TRIAGE (OUTPATIENT)
Dept: NURSING | Facility: CLINIC | Age: 33
End: 2018-03-18

## 2018-03-19 NOTE — TELEPHONE ENCOUNTER
Got page from [facility] to call patient .  Patient states she ate a sandwich at the airport and got sick afterwards with vomiting and diarrhea.  She has been able to drink Gator aid, some water and eat crackers.  She felt better and ate some yogurt and the proceeded to have an emesis.  She feels better now and is still able to drink.  Has been urinating normally.  Baby is active, denies loss of fluid/VB or contractions/cramping.  Just wanted to check in about anti-diarrhea meds-advised Imodium  Advised if patient not feeling better, continuing to have diarrhea and/or vomiting to call in clinic in morning for evaluation.  Patient agrees with plan.  No further questions.  SINCERE Leos, CNM

## 2018-03-19 NOTE — TELEPHONE ENCOUNTER
Additional Information    Negative: [1] Caller is not with the adult (patient) AND [2] reporting urgent symptoms    Negative: Lab result questions    Negative: Medication questions    Negative: Caller cannot be reached by phone    Negative: Caller has already spoken to PCP or another triager    Negative: RN needs further essential information from caller in order to complete triage    Negative: Requesting regular office appointment    Negative: [1] Caller requesting NON-URGENT health information AND [2] PCP's office is the best resource    Negative: Health Information question, no triage required and triager able to answer question    Negative: General information question, no triage required and triager able to answer question    Negative: Question about upcoming scheduled test, no triage required and triager able to answer question    Negative: [1] Caller is not with the adult (patient) AND [2] probable NON-URGENT symptoms    [1] Follow-up call to recent contact AND [2] information only call, no triage required    Protocols used: INFORMATION ONLY CALL-ADULT-AKIRA Lester, pt's , calls and says that he and his wife are in [place] and that his wife may have food poisoning. Pt. Says that she ate food, in the airport terminal, yesterday, and has had vomiting and diarrhea, since then. Pt. Is 21 weeks pregnant. Because pt. Was across state lines, this nurse told pt. That this nurse could not triage pt's symptoms. RN told pt. That pt. Can go to an UC clinic or to an ER, in [place], for pt's symptoms. Pt. Was not sure what she was going to do and wants to speak to her midwife. [place] [facility], was then paged, to call Elizabeth at: [phone], at: [phone], via page .

## 2018-04-10 ENCOUNTER — PRENATAL OFFICE VISIT (OUTPATIENT)
Dept: MIDWIFE SERVICES | Facility: CLINIC | Age: 33
End: 2018-04-10
Payer: COMMERCIAL

## 2018-04-10 VITALS — WEIGHT: 251 LBS | BODY MASS INDEX: 40.51 KG/M2 | DIASTOLIC BLOOD PRESSURE: 78 MMHG | SYSTOLIC BLOOD PRESSURE: 120 MMHG

## 2018-04-10 DIAGNOSIS — Z3A.24 24 WEEKS GESTATION OF PREGNANCY: ICD-10-CM

## 2018-04-10 DIAGNOSIS — O99.212 OBESITY AFFECTING PREGNANCY IN SECOND TRIMESTER: ICD-10-CM

## 2018-04-10 DIAGNOSIS — E03.9 HYPOTHYROIDISM AFFECTING PREGNANCY, ANTEPARTUM: ICD-10-CM

## 2018-04-10 DIAGNOSIS — O09.92 SUPERVISION OF HIGH RISK PREGNANCY IN SECOND TRIMESTER: Primary | ICD-10-CM

## 2018-04-10 DIAGNOSIS — O99.280 HYPOTHYROIDISM AFFECTING PREGNANCY, ANTEPARTUM: ICD-10-CM

## 2018-04-10 DIAGNOSIS — Z23 NEED FOR TDAP VACCINATION: ICD-10-CM

## 2018-04-10 PROCEDURE — 99207 ZZC PRENATAL VISIT: CPT | Performed by: ADVANCED PRACTICE MIDWIFE

## 2018-04-10 NOTE — PROGRESS NOTES
Feels well. Having occasional hip and back aches. Discussed comfort measures.  Having heartburn more frequently, relieved with Tums and water. Discussed other relief measures and encouraged to contact if heartburn gets worse or is unrelieved with current regime.   Elizabeth noted that she has been feeling increased anxiety this past month related to work stress and financial obligations. She states that she is managing it well on her own and feels that the anxiety is situational. Advised to call or return to clinic if symptoms worsens or do not resolve. States understanding of plan.    Fetal movement: positive   Denies loss of fluid/vb/contractions  GCT next visit, handout provided, reminded of longer appointment  Tdap next visit; reviewed CDC recommendations and partner/family vaccination recommended as well  Water birth discussed, patient is possibly interested. Hep C futured. Discussed that she is currently at BMI and weight cutoff for being eligible for waterbirth so she would need to maintain her current weight.  Need for Rhogam? No, A pos  TSH next visit; order futured.   Decided to not go ahead with repeat anatomy US. Will do growth US at 32 weeks.   Return to clinic 4 weeks    BECKY Neal  [facility]    I, Ivy Wilcox, am serving as a scribe; to document services personally performed by Tash Pennington DNP, SINCERE, KRYSTYNA- based on data collection and the provider's statements to me.    Provider Disclosure:  I agree with above History, Review of Systems, Physical exam and Plan. I have reviewed the content of the documentation and have edited it as needed. I have personally performed the services documented here and the documentation accurately represents those services and the decisions I have made.    Tash Pennington DNP, SINCERE, KRYSTYNA

## 2018-04-10 NOTE — MR AVS SNAPSHOT
After Visit Summary   4/10/2018    Elizabeth Crooks    MRN: [id number]           Patient Information     Date Of Birth          1985        Visit Information        Provider Department      4/10/2018 9:00 AM Tash Pennington APRN CNM [facility] [place]        Today's Diagnoses     Supervision of high risk pregnancy in second trimester    -  1    24 weeks gestation of pregnancy        Need for Tdap vaccination        Obesity affecting pregnancy in second trimester        Hypothyroidism affecting pregnancy, antepartum          Care Instructions    GESTATIONAL DIABETES SCREENING    All pregnant women are screened at least once for diabetes as part of their prenatal care. A woman has gestational diabetes if she has high blood sugars for the first time during pregnancy.      Diabetes can harm your health and the health of your baby.  But if we find the diabetes early in pregnancy we will watch your health closely and prevent further problems.       We will check for gestational diabetes during your visit between 24-28 weeks visit. Please note you can not do this prior to 24 weeks of gestation.      Plan to spend about an hour at the clinic.  When you check in let us know that you will be having your diabetes screening that day.       We will give you a 50 gram glucose drink that you have 5 minutes to consume.  Exactly one hour later you will have draw blood from your arm to check your blood sugar level.      We will call you to let you know if your results are normal.  If the results are normal no more testing will be needed.  If your results are not normal we will discuss follow up testing with you.        You may eat prior to the testing but it is not recommended to eat or drink very sweet things such as pop, juice, candy or dessert type foods.  Eat a high protein, low carb meals prior to testing.    If you have any questions please call:    [facility]     [phone]            Follow-ups after your visit        Follow-up notes from your care team     Return in about 4 weeks (around 5/8/2018) for Routine Visit.      Your next 10 appointments already scheduled     May 08, 2018  8:00 AM CDT   Glucose Tolerance Test with [facility]   [facility] ([facility] [place])    [address]  [place] [id number] [phone]            May 08, 2018  8:20 AM CDT   ESTABLISHED PRENATAL with SINCERE Adames CNM   [facility] [place] ([facility] [place])    [address]  [place] [id number] [phone]              Future tests that were ordered for you today     Open Future Orders        Priority Expected Expires Ordered    TSH with free T4 reflex Routine  4/10/2019 4/10/2018    Hepatitis C antibody Routine  4/10/2019 4/10/2018            Who to contact     If you have questions or need follow up information about today's clinic visit or your schedule please contact [facility] directly at [phone].  Normal or non-critical lab and imaging results will be communicated to you by AuthorBeehart, letter or phone within 4 business days after the clinic has received the results. If you do not hear from us within 7 days, please contact the clinic through [employer] or phone. If you have a critical or abnormal lab result, we will notify you by phone as soon as possible.  Submit refill requests through [employer] or call your pharmacy and they will forward the refill request to us. Please allow 3 business days for your refill to be completed.          Additional Information About Your Visit        AuthorBeehart Information     [employer] gives you secure access to your electronic health record. If you see a primary care provider, you can also send messages to your care team and make appointments. If you have questions, please call your primary care clinic.  If you do not  have a primary care provider, please call [phone] and they will assist you.        Care EveryWhere ID     This is your Care EveryWhere ID. This could be used by other organizations to access your [facility] medical records  [id number]        Your Vitals Were     Last Period BMI (Body Mass Index)                10/11/2017 40.51 kg/m2           Blood Pressure from Last 3 Encounters:   04/10/18 120/78   03/14/18 116/78   02/27/18 114/76    Weight from Last 3 Encounters:   04/10/18 251 lb (113.9 kg)   03/14/18 250 lb (113.4 kg)   02/27/18 250 lb (113.4 kg)               Primary Care Provider    Provider Outside       No address on file        Equal Access to Services     KYLE ROCHA : Rosalio Chau, walaura grayson, qaybta kaalmada antonette, yadira sellers. So [facility] [phone].    ATENCIÓN: Si habla español, tiene a mcrae disposición servicios gratuitos de asistencia lingüística. Llame al [phone].    We comply with applicable federal civil rights laws and [place] laws. We do not discriminate on the basis of race, color, national origin, age, disability, sex, sexual orientation, or gender identity.            Thank you!     Thank you for choosing [facility]  for your care. Our goal is always to provide you with excellent care. Hearing back from our patients is one way we can continue to improve our services. Please take a few minutes to complete the written survey that you may receive in the mail after your visit with us. Thank you!             Your Updated Medication List - Protect others around you: Learn how to safely use, store and throw away your medicines at www.disposemymeds.org.          This list is accurate as of 4/10/18 10:16 AM.  Always use your most recent med list.                   Brand Name Dispense Instructions for use Diagnosis    levothyroxine 50 MCG tablet    SYNTHROID    90 tablet    Take 1 tablet (50 mcg) by mouth daily     Supervision of high risk pregnancy in first trimester       prenatal multivitamin plus iron 27-0.8 MG Tabs per tablet      Take 1 tablet by mouth daily    Supervision of high risk pregnancy in first trimester       VITAMIN D-3 PO      Take 4,000 Units by mouth

## 2018-05-08 ENCOUNTER — PRENATAL OFFICE VISIT (OUTPATIENT)
Dept: MIDWIFE SERVICES | Facility: CLINIC | Age: 33
End: 2018-05-08
Payer: COMMERCIAL

## 2018-05-08 VITALS — SYSTOLIC BLOOD PRESSURE: 126 MMHG | DIASTOLIC BLOOD PRESSURE: 74 MMHG | BODY MASS INDEX: 41.48 KG/M2 | WEIGHT: 257 LBS

## 2018-05-08 DIAGNOSIS — Z23 NEED FOR TDAP VACCINATION: ICD-10-CM

## 2018-05-08 DIAGNOSIS — O09.93 SUPERVISION OF HIGH RISK PREGNANCY IN THIRD TRIMESTER: Primary | ICD-10-CM

## 2018-05-08 DIAGNOSIS — O99.213 OBESITY AFFECTING PREGNANCY IN THIRD TRIMESTER: ICD-10-CM

## 2018-05-08 DIAGNOSIS — Z3A.28 28 WEEKS GESTATION OF PREGNANCY: ICD-10-CM

## 2018-05-08 DIAGNOSIS — E03.9 HYPOTHYROIDISM AFFECTING PREGNANCY, ANTEPARTUM: ICD-10-CM

## 2018-05-08 DIAGNOSIS — Z36.9 ENCOUNTER FOR ANTENATAL SCREENING OF MOTHER: Primary | ICD-10-CM

## 2018-05-08 DIAGNOSIS — O09.92 SUPERVISION OF HIGH RISK PREGNANCY IN SECOND TRIMESTER: ICD-10-CM

## 2018-05-08 DIAGNOSIS — O99.280 HYPOTHYROIDISM AFFECTING PREGNANCY, ANTEPARTUM: ICD-10-CM

## 2018-05-08 PROBLEM — Z34.92 UNCERTAIN DATES, ANTEPARTUM, SECOND TRIMESTER: Status: RESOLVED | Noted: 2017-12-13 | Resolved: 2018-05-08

## 2018-05-08 LAB
ERYTHROCYTE [DISTWIDTH] IN BLOOD BY AUTOMATED COUNT: 13.4 % (ref 10–15)
GLUCOSE 1H P 50 G GLC PO SERPL-MCNC: 82 MG/DL (ref 60–129)
HCT VFR BLD AUTO: 34.8 % (ref 35–47)
HCV AB SERPL QL IA: NONREACTIVE
HGB BLD-MCNC: 11.5 G/DL (ref 11.7–15.7)
MCH RBC QN AUTO: 29.6 PG (ref 26.5–33)
MCHC RBC AUTO-ENTMCNC: 33 G/DL (ref 31.5–36.5)
MCV RBC AUTO: 90 FL (ref 78–100)
PLATELET # BLD AUTO: 288 10E9/L (ref 150–450)
RBC # BLD AUTO: 3.89 10E12/L (ref 3.8–5.2)
TSH SERPL DL<=0.005 MIU/L-ACNC: 0.5 MU/L (ref 0.4–4)
WBC # BLD AUTO: 12.4 10E9/L (ref 4–11)

## 2018-05-08 PROCEDURE — 86803 HEPATITIS C AB TEST: CPT | Performed by: ADVANCED PRACTICE MIDWIFE

## 2018-05-08 PROCEDURE — 85027 COMPLETE CBC AUTOMATED: CPT | Performed by: ADVANCED PRACTICE MIDWIFE

## 2018-05-08 PROCEDURE — 90471 IMMUNIZATION ADMIN: CPT

## 2018-05-08 PROCEDURE — 82950 GLUCOSE TEST: CPT | Performed by: ADVANCED PRACTICE MIDWIFE

## 2018-05-08 PROCEDURE — 84443 ASSAY THYROID STIM HORMONE: CPT | Performed by: ADVANCED PRACTICE MIDWIFE

## 2018-05-08 PROCEDURE — 99207 ZZC PRENATAL VISIT: CPT | Performed by: ADVANCED PRACTICE MIDWIFE

## 2018-05-08 PROCEDURE — 90715 TDAP VACCINE 7 YRS/> IM: CPT

## 2018-05-08 PROCEDURE — 36415 COLL VENOUS BLD VENIPUNCTURE: CPT | Performed by: ADVANCED PRACTICE MIDWIFE

## 2018-05-08 NOTE — MR AVS SNAPSHOT
After Visit Summary   5/8/2018    Elizabeth Crooks    MRN: [id number]           Patient Information     Date Of Birth          1985        Visit Information        Provider Department      5/8/2018 8:20 AM Anupama Byers APRN CNM [facility]        Today's Diagnoses     Supervision of high risk pregnancy in third trimester    -  1    Need for Tdap vaccination        Obesity affecting pregnancy in third trimester        BMI 36.0-36.9,adult        Hypothyroidism affecting pregnancy, antepartum        28 weeks gestation of pregnancy           Follow-ups after your visit        Follow-up notes from your care team     Return in about 2 weeks (around 5/22/2018).      Future tests that were ordered for you today     Open Future Orders        Priority Expected Expires Ordered    US OB Ltd One Or More Fetus FU/Repeat Routine  5/8/2019 5/8/2018    TDAP VACCINE (ADACEL) Routine  5/31/2018 4/20/2018    VACCINE ADMINISTRATION, INITIAL Routine  5/31/2018 4/20/2018            Who to contact     If you have questions or need follow up information about today's clinic visit or your schedule please contact [facility] directly at [phone].  Normal or non-critical lab and imaging results will be communicated to you by MyChart, letter or phone within 4 business days after the clinic has received the results. If you do not hear from us within 7 days, please contact the clinic through SocialProofhart or phone. If you have a critical or abnormal lab result, we will notify you by phone as soon as possible.  Submit refill requests through [employer] or call your pharmacy and they will forward the refill request to us. Please allow 3 business days for your refill to be completed.          Additional Information About Your Visit        MyChart Information     [employer] gives you secure access to your electronic health record. If you see a primary care provider, you can also send  messages to your care team and make appointments. If you have questions, please call your primary care clinic.  If you do not have a primary care provider, please call [phone] and they will assist you.        Care EveryWhere ID     This is your Care EveryWhere ID. This could be used by other organizations to access your [facility] medical records  [id number]        Your Vitals Were     Last Period BMI (Body Mass Index)                10/11/2017 41.48 kg/m2           Blood Pressure from Last 3 Encounters:   05/08/18 126/74   04/10/18 120/78   03/14/18 116/78    Weight from Last 3 Encounters:   05/08/18 257 lb (116.6 kg)   04/10/18 251 lb (113.9 kg)   03/14/18 250 lb (113.4 kg)              We Performed the Following     Hepatitis C antibody     TSH with free T4 reflex        Primary Care Provider Office Phone # Fax #    [facility] [place] [facility] [phone]       [place] [address]  [address] [address]        Equal Access to Services     KYLE ROCHA : Hadii aad ku hadasho Soran, waaxda luqadaha, qaybta kaalmada adeinderjit, yadira sullivan . So [facility] [phone].    ATENCIÓN: Si habla español, tiene a mcrae disposición servicios gratuitos de asistencia lingüística. Fernanda al [phone].    We comply with applicable federal civil rights laws and [place] laws. We do not discriminate on the basis of race, color, national origin, age, disability, sex, sexual orientation, or gender identity.            Thank you!     Thank you for choosing [facility]  for your care. Our goal is always to provide you with excellent care. Hearing back from our patients is one way we can continue to improve our services. Please take a few minutes to complete the written survey that you may receive in the mail after your visit with us. Thank you!             Your Updated Medication List - Protect others around you: Learn  how to safely use, store and throw away your medicines at www.disposemymeds.org.          This list is accurate as of 5/8/18  8:50 AM.  Always use your most recent med list.                   Brand Name Dispense Instructions for use Diagnosis    levothyroxine 50 MCG tablet    SYNTHROID    90 tablet    Take 1 tablet (50 mcg) by mouth daily    Supervision of high risk pregnancy in first trimester       prenatal multivitamin plus iron 27-0.8 MG Tabs per tablet      Take 1 tablet by mouth daily    Supervision of high risk pregnancy in first trimester       VITAMIN D-3 PO      Take 4,000 Units by mouth

## 2018-05-08 NOTE — PROGRESS NOTES
Syphilis is a sexually transmitted disease that can cause birth defects in the babies of untreated mothers. Every pregnant patient is tested for syphilis early in each pregnancy as part of the routine lab work. The [place] Department of [facility] has seen an increase in the rate of syphilis in [place]. The [facility] now recommends testing for syphilis 3 times during a pregnancy, the new prenatal visit, 28 weeks and when admitted for delivery. Patient declines lab testing for syphilis.

## 2018-05-08 NOTE — PROGRESS NOTES
Feels well! No complaints. Thinks she maybe lost mucus plug but nothing else concerning. Happened around 30 weeks last time.   Working as a , has maternity leave planned and her  is also taking 8 weeks off!  Fetal movement: positive, denies loss of fluid/vb/contractions  GCT, CBC drawn today, TSH drawn  Tdap given: Yes  Anti Treponema drawn: declined  Hep C drawn: Yes- discussed weight restrictions, patient aware, would like to do hydrotherapy in the least  Will schedule growth at 32 weeks, order placed  Water birth consent signed: not yet as changes are being     Reviewed PTL precautions and S&S of [facility], patient verbalizes understanding and what to report  Hospital Registration reminder-online  Return to clinic 2 weeks    SINCERE Ireland, FARHANM

## 2018-05-08 NOTE — PROGRESS NOTES
Screening Questionnaire for Adult Immunization    Are you sick today?   No   Do you have allergies to medications, food, a vaccine component or latex?   No   Have you ever had a serious reaction after receiving a vaccination?   No   Do you have a long-term health problem with heart disease, lung disease, asthma, kidney disease, metabolic disease (e.g. diabetes), anemia, or other blood disorder?   No   Do you have cancer, leukemia, HIV/AIDS, or any other immune system problem?   No   In the past 3 months, have you taken medications that affect  your immune system, such as prednisone, other steroids, or anticancer drugs; drugs for the treatment of rheumatoid arthritis, Crohn s disease, or psoriasis; or have you had radiation treatments?   No   Have you had a seizure, or a brain or other nervous system problem?   No   During the past year, have you received a transfusion of blood or blood     products, or been given immune (gamma) globulin or antiviral drug?   No   For women: Are you pregnant or is there a chance you could become        pregnant during the next month?   PREGNANT   Have you received any vaccinations in the past 4 weeks?   No     Immunization questionnaire answers were all negative.        Per orders of Dr. Julee Byers, injection of Tdap given by Hortencia Hernandez. Patient instructed to remain in clinic for 15 minutes afterwards, and to report any adverse reaction to me immediately.       Screening performed by Hortencia Hernandez on 5/8/2018 at 9:18 AM.

## 2018-05-09 NOTE — PROGRESS NOTES
Lyn Johnson,    Your Hepatitis C Antibody results came back as nonreactive. Therefore you do not have it (good news!). If you have any questions or concerns, then just call us or fypiohart message us. Otherwise we can discuss it at your next visit.    Hope you are well!  Chico Hussein, SANDRA, APRN, CNM

## 2018-05-22 ENCOUNTER — PRENATAL OFFICE VISIT (OUTPATIENT)
Dept: MIDWIFE SERVICES | Facility: CLINIC | Age: 33
End: 2018-05-22
Payer: COMMERCIAL

## 2018-05-22 VITALS — DIASTOLIC BLOOD PRESSURE: 66 MMHG | SYSTOLIC BLOOD PRESSURE: 116 MMHG | WEIGHT: 258 LBS | BODY MASS INDEX: 41.64 KG/M2

## 2018-05-22 DIAGNOSIS — O99.213 OBESITY AFFECTING PREGNANCY IN THIRD TRIMESTER: ICD-10-CM

## 2018-05-22 DIAGNOSIS — O09.93 SUPERVISION OF HIGH RISK PREGNANCY IN THIRD TRIMESTER: Primary | ICD-10-CM

## 2018-05-22 PROBLEM — Z23 NEED FOR TDAP VACCINATION: Status: RESOLVED | Noted: 2017-12-13 | Resolved: 2018-05-22

## 2018-05-22 PROCEDURE — 99207 ZZC PRENATAL VISIT: CPT | Performed by: ADVANCED PRACTICE MIDWIFE

## 2018-05-22 NOTE — LETTER
May 22, 2018      Elizabeth Crooks  [address] [address]        To Whom It May Concern:    Elizabeth Crooks is under the care of the midwives at [facility] . She is currently in her 30th week of pregnancy. At this time there are no limitations to her flying.        Sincerely,        SINCERE Bourgeois CNM

## 2018-05-22 NOTE — MR AVS SNAPSHOT
After Visit Summary   2018    Elizabeth Crooks    MRN: [id number]           Patient Information     Date Of Birth          1985        Visit Information        Provider Department      2018 9:00 AM Tabatha Ortega APRN CNM [facility]        Today's Diagnoses     Supervision of high risk pregnancy in third trimester    -  1    Obesity affecting pregnancy in third trimester          Care Instructions    SIGNS OF  LABOR    Labor is  if it happens more than three weeks before your due date.    It can be hard to know if you are in labor, since the symptoms can be like the normal feelings of pregnancy.  Often, the only difference is the symptoms increase or they don't go away.     Signs of  labor can include:      Contractions which can feel like period cramps or gas pain.  You may feel it in the lower part of your abdomen, in your back, or as a pressure feeling in your bottom.  It is often regular, coming every 5 or 10 minutes, and  lasting about 30-60 seconds. Some contractions are normal during pregnancy (Jacinto orellana contractions) but if you are feeling more than 5-6 in one hour that is NOT normal    If this occurs empty your bladder, then drink 2-3 glasses of water, eat a snack, and lay down on your left side. Put your hand on your abdomen to count the contractions.  If after one hour of resting you have still had 5-6 contractions call your clinic right away.      If you feel a pop, gush, or trickle of fluid it may mean that your bag of water has broken and you should contact the clinic       You may also experience loose stools and/or rectal pressure       Listen to your body, if something doesn't seem right please call us at the clinic    Risk Factors      Previous  delivery    Bacterial Vaginosis- if you notice a fishy smell to your discharge or experience vaginal itching/discomfort you should be evaluated for  "infection    Smoking    Drug abuse    Adolescent (teen) pregnancy or advanced maternal age (AMA) age 35 and over    Dehydration (this may not cause  labor but it can cause contractions)    If you think you are in  labor we may do some lab testing in the clinic or send you to the hospital for evaluation    Please call us if you are concerned you are in  labor.    [facility]  [phone]    PREECLAMPSIA SIGNS AND SYMPTOMS    Preeclampsia is a dangerous condition that some women develop in the second half of pregnancy. It can also begin after the baby is born.  Preeclampsia causes high blood pressure and can cause problems with many organ systems in your body.  It can also affect the growth of your baby. The exact cause of preeclampsia is unknown, however, there are signs and symptoms to watch for:    -A bad headache that doesn't improve with Tylenol  -Visual changes such as spots, flashes of light, blurry vision  -Pain in the upper right part of your abdomen, especially under the ribs  -Nausea and/or vomiting  -Feeling extremely tired  -Yellowing of the skin and/or eyes  -Feeling \"not quite right\" or that something is wrong  -An extreme amount of swelling (some swelling in pregnancy is very normal)    If your midwife feels that you are developing preeclampsia, you will have lab tests drawn and will be monitored very closely.     If you are experiencing these symptoms, please call the [facility]    Fetal Kick Counts    It is important to know when your baby's movements occur. We often get busy with work and life and do not pay close attention to their movements.        Women typically begin feeling movement between 18-22 weeks of gestation, sometimes it can be earlier or later depending on where your placenta is       Movements usually begin feeling like popping or fluttering and as the baby grows they become more pronounced    Toward the end of pregnancy as the " baby gets larger they may not move as much or make as big of movements. Babies have maturing sleep cycles as well as not as much room to move and flip. If you are ever concerned about your baby's movements or have not felt the baby move for a while, we recommend you do a fetal kick count. Prior to starting your count drink a glass of water or juice and eat a snack. Then lay down on your side and begin to count movements.     How to do a Fetal Kick Counts    There are many different ways to monitor your baby's movements. Movements can range from large jabs to small kicks, or wiggles.  Hiccups count!      Count 10 movements in 2 hours when resting and focusing    Count 10 movements in 12 hours when doing normal activity    We recommend that if movements occur but seem decreased that you should be seen in the clinic or hospital for evaluation within 12 hours. If fetal movement is absent or fetal kick counts are low please contact us right away.    If you ever have any concerns about your baby's movements DO NOT HESITATE to call us, we are here for you!    [facility]  [phone]                Follow-ups after your visit        Follow-up notes from your care team     Return for Prenatal Visit.      Your next 10 appointments already scheduled     Jun 05, 2018  8:40 AM CDT   US OB SINGLE FOLLOW UP REPEAT with WEUS1   [facility] [place] ([facility] [place])    [address] [place] [id number] [phone]           Please bring a list of your medicines (including vitamins, minerals and over-the-counter drugs). Also, tell your doctor about any allergies you may have. Wear comfortable clothes and leave your valuables at home.  If you re less than 20 weeks drink four 8-ounce glasses of fluid an hour before your exam. If you need to empty your bladder before your exam, try to release only a little urine. Then, drink another glass of fluid.  You may have up to  two family members in the exam room. If you bring a small child, an adult must be there to care for him or her.  Please call the Imaging Department at your exam site with any questions.            Jun 05, 2018  9:10 AM CDT   ESTABLISHED PRENATAL with SINCERE Adames CNM   [facility] [place] ([facility] [place])    [address]  [place] [address] [phone]            Jun 19, 2018  8:00 AM CDT   ESTABLISHED PRENATAL with SINCERE Adames CNM   [facility] [place] ([facility] [place])    [address]  [place] [id number] [phone]              Who to contact     If you have questions or need follow up information about today's clinic visit or your schedule please contact [facility] [place] directly at [phone].  Normal or non-critical lab and imaging results will be communicated to you by RealGravityhart, letter or phone within 4 business days after the clinic has received the results. If you do not hear from us within 7 days, please contact the clinic through Bringrrt or phone. If you have a critical or abnormal lab result, we will notify you by phone as soon as possible.  Submit refill requests through [employer] or call your pharmacy and they will forward the refill request to us. Please allow 3 business days for your refill to be completed.          Additional Information About Your Visit        Real[employer]ity[employer]Doctors Information     [employer] gives you secure access to your electronic health record. If you see a primary care provider, you can also send messages to your care team and make appointments. If you have questions, please call your primary care clinic.  If you do not have a primary care provider, please call [phone] and they will assist you.        Care EveryWhere ID     This is your Care EveryWhere ID. This could be used by other organizations to access your [facility]  records  [id number]        Your Vitals Were     Last Period BMI (Body Mass Index)                10/11/2017 41.64 kg/m2           Blood Pressure from Last 3 Encounters:   05/22/18 116/66   05/08/18 126/74   04/10/18 120/78    Weight from Last 3 Encounters:   05/22/18 258 lb (117 kg)   05/08/18 257 lb (116.6 kg)   04/10/18 251 lb (113.9 kg)              Today, you had the following     No orders found for display       Primary Care Provider Office Phone # Fax #    [facility] [phone]       [facility] [address]  [address]  [facility] [address]        Equal Access to Services     KYLE ROCHA : Rosalio olmsteado Soran, waaxda luqadaha, qaybta kaalmada adeegyada, yadira sellers. So [facility] [phone].    ATENCIÓN: Si habla español, tiene a mcrae disposición servicios gratuitos de asistencia lingüística. Llame al [phone].    We comply with applicable federal civil rights laws and [place] laws. We do not discriminate on the basis of race, color, national origin, age, disability, sex, sexual orientation, or gender identity.            Thank you!     Thank you for choosing [facility]  for your care. Our goal is always to provide you with excellent care. Hearing back from our patients is one way we can continue to improve our services. Please take a few minutes to complete the written survey that you may receive in the mail after your visit with us. Thank you!             Your Updated Medication List - Protect others around you: Learn how to safely use, store and throw away your medicines at www.disposemymeds.org.          This list is accurate as of 5/22/18  9:52 AM.  Always use your most recent med list.                   Brand Name Dispense Instructions for use Diagnosis    levothyroxine 50 MCG tablet    SYNTHROID    90 tablet    Take 1 tablet (50 mcg) by mouth daily    Supervision of high  risk pregnancy in first trimester       prenatal multivitamin plus iron 27-0.8 MG Tabs per tablet      Take 1 tablet by mouth daily    Supervision of high risk pregnancy in first trimester       VITAMIN D-3 PO      Take 4,000 Units by mouth

## 2018-05-22 NOTE — PROGRESS NOTES
Feels well overall. Has legs cramps after long walks. Resolved by increasing hydration.   Plans on flying this weekend. Flight is 3 hours. Letter provided that she is okay to travel, incase she is questioned at the airport. Encouraged to increase hydration, and ambulation/stretching while on the plane  Fetal Movement: positive, denies loss of fluid/vb, no contractions  Fetal kick counts reviewed and handout given  Reviewed PTL precautions and s/sx of preeclampsia; denies any S&S and aware of what to report  Growth U/S ordered 32 weeks, already has scheduled.     Return to clinic in 2 weeks

## 2018-05-22 NOTE — PATIENT INSTRUCTIONS
SIGNS OF  LABOR    Labor is  if it happens more than three weeks before your due date.    It can be hard to know if you are in labor, since the symptoms can be like the normal feelings of pregnancy.  Often, the only difference is the symptoms increase or they don't go away.     Signs of  labor can include:      Contractions which can feel like period cramps or gas pain.  You may feel it in the lower part of your abdomen, in your back, or as a pressure feeling in your bottom.  It is often regular, coming every 5 or 10 minutes, and  lasting about 30-60 seconds. Some contractions are normal during pregnancy (Brewster orellana contractions) but if you are feeling more than 5-6 in one hour that is NOT normal    If this occurs empty your bladder, then drink 2-3 glasses of water, eat a snack, and lay down on your left side. Put your hand on your abdomen to count the contractions.  If after one hour of resting you have still had 5-6 contractions call your clinic right away.      If you feel a pop, gush, or trickle of fluid it may mean that your bag of water has broken and you should contact the clinic       You may also experience loose stools and/or rectal pressure       Listen to your body, if something doesn't seem right please call us at the clinic    Risk Factors      Previous  delivery    Bacterial Vaginosis- if you notice a fishy smell to your discharge or experience vaginal itching/discomfort you should be evaluated for infection    Smoking    Drug abuse    Adolescent (teen) pregnancy or advanced maternal age (AMA) age 35 and over    Dehydration (this may not cause  labor but it can cause contractions)    If you think you are in  labor we may do some lab testing in the clinic or send you to the hospital for evaluation    Please call us if you are concerned you are in  labor.    [facility]  [phone]    PREECLAMPSIA SIGNS AND SYMPTOMS    Preeclampsia is a  "dangerous condition that some women develop in the second half of pregnancy. It can also begin after the baby is born.  Preeclampsia causes high blood pressure and can cause problems with many organ systems in your body.  It can also affect the growth of your baby. The exact cause of preeclampsia is unknown, however, there are signs and symptoms to watch for:    -A bad headache that doesn't improve with Tylenol  -Visual changes such as spots, flashes of light, blurry vision  -Pain in the upper right part of your abdomen, especially under the ribs  -Nausea and/or vomiting  -Feeling extremely tired  -Yellowing of the skin and/or eyes  -Feeling \"not quite right\" or that something is wrong  -An extreme amount of swelling (some swelling in pregnancy is very normal)    If your midwife feels that you are developing preeclampsia, you will have lab tests drawn and will be monitored very closely.     If you are experiencing these symptoms, please call the [facility]    Fetal Kick Counts    It is important to know when your baby's movements occur. We often get busy with work and life and do not pay close attention to their movements.        Women typically begin feeling movement between 18-22 weeks of gestation, sometimes it can be earlier or later depending on where your placenta is       Movements usually begin feeling like popping or fluttering and as the baby grows they become more pronounced    Toward the end of pregnancy as the baby gets larger they may not move as much or make as big of movements. Babies have maturing sleep cycles as well as not as much room to move and flip. If you are ever concerned about your baby's movements or have not felt the baby move for a while, we recommend you do a fetal kick count. Prior to starting your count drink a glass of water or juice and eat a snack. Then lay down on your side and begin to count movements.     How to do a Fetal Kick Counts    There are many " different ways to monitor your baby's movements. Movements can range from large jabs to small kicks, or wiggles.  Hiccups count!      Count 10 movements in 2 hours when resting and focusing    Count 10 movements in 12 hours when doing normal activity    We recommend that if movements occur but seem decreased that you should be seen in the clinic or hospital for evaluation within 12 hours. If fetal movement is absent or fetal kick counts are low please contact us right away.    If you ever have any concerns about your baby's movements DO NOT HESITATE to call us, we are here for you!    [facility]  [phone]

## 2018-06-05 ENCOUNTER — RADIANT APPOINTMENT (OUTPATIENT)
Dept: ULTRASOUND IMAGING | Facility: CLINIC | Age: 33
End: 2018-06-05
Attending: ADVANCED PRACTICE MIDWIFE
Payer: COMMERCIAL

## 2018-06-05 ENCOUNTER — PRENATAL OFFICE VISIT (OUTPATIENT)
Dept: MIDWIFE SERVICES | Facility: CLINIC | Age: 33
End: 2018-06-05
Attending: ADVANCED PRACTICE MIDWIFE
Payer: COMMERCIAL

## 2018-06-05 VITALS — SYSTOLIC BLOOD PRESSURE: 128 MMHG | DIASTOLIC BLOOD PRESSURE: 74 MMHG | WEIGHT: 261 LBS | BODY MASS INDEX: 42.13 KG/M2

## 2018-06-05 DIAGNOSIS — O99.213 OBESITY AFFECTING PREGNANCY IN THIRD TRIMESTER: ICD-10-CM

## 2018-06-05 DIAGNOSIS — O09.93 SUPERVISION OF HIGH RISK PREGNANCY IN THIRD TRIMESTER: ICD-10-CM

## 2018-06-05 DIAGNOSIS — Z3A.32 32 WEEKS GESTATION OF PREGNANCY: ICD-10-CM

## 2018-06-05 DIAGNOSIS — O09.93 SUPERVISION OF HIGH RISK PREGNANCY IN THIRD TRIMESTER: Primary | ICD-10-CM

## 2018-06-05 PROCEDURE — 76816 OB US FOLLOW-UP PER FETUS: CPT | Performed by: OBSTETRICS & GYNECOLOGY

## 2018-06-05 PROCEDURE — 99207 ZZC PRENATAL VISIT: CPT | Performed by: ADVANCED PRACTICE MIDWIFE

## 2018-06-05 NOTE — PROGRESS NOTES
Feels well! Feeling a bit more swollen but it relieves with elevation and hydration.  Here with  and son today.  Fetal Movement: positive, denies loss of fluid/vb, no contractions  Fetal kick counts/movement reviewed  Reviewed PTL precautions and s/sx of preeclampsia; denies any S&S and aware of what to report  Peds chosen: [facility] at [facility]  Pediatrician: Hep B, Vit K, Erythromycin and circumcision; advised to check insurance for hospital vs clinic  Plans to breastfeed Yes  Breastfeeding class planned-no had no issues breastfeeding son Davey, breast fed x 13-14 months      Return to clinic 2 weeks    SINCERE Ireland, CNM

## 2018-06-05 NOTE — MR AVS SNAPSHOT
After Visit Summary   6/5/2018    Elizabeth Crooks    MRN: [id number]           Patient Information     Date Of Birth          1985        Visit Information        Provider Department      6/5/2018 9:10 AM Anupama Byers APRN CNM [facility] [place]        Today's Diagnoses     Supervision of high risk pregnancy in third trimester    -  1    Obesity affecting pregnancy in third trimester        BMI 36.0-36.9,adult        32 weeks gestation of pregnancy           Follow-ups after your visit        Follow-up notes from your care team     Return in about 2 weeks (around 6/19/2018).      Your next 10 appointments already scheduled     Jun 19, 2018  8:00 AM CDT   ESTABLISHED PRENATAL with SINCERE Adames CNM   [facility] [place] ([facility] [place])    [address] [place] [address] [address]  [place] [address]   [phone]            Jul 03, 2018  9:00 AM CDT   ESTABLISHED PRENATAL with SINCERE Krishnan CNM   [facility] [place] ([facility] [place])    [address] [place] [address] [address]  [facility] [address]   [phone]              Who to contact     If you have questions or need follow up information about today's clinic visit or your schedule please contact [facility] [place] directly at [phone].  Normal or non-critical lab and imaging results will be communicated to you by MyChart, letter or phone within 4 business days after the clinic has received the results. If you do not hear from us within 7 days, please contact the clinic through MyChart or phone. If you have a critical or abnormal lab result, we will notify you by phone as soon as possible.  Submit refill requests through [employer] or call your pharmacy and they will forward the refill request to us. Please allow 3 business days for your refill to be completed.          Additional Information About Your Visit        MyChart  Information     [facility] gives you secure access to your electronic health record. If you see a primary care provider, you can also send messages to your care team and make appointments. If you have questions, please call your primary care clinic.  If you do not have a primary care provider, please call [phone] and they will assist you.        Care EveryWhere ID     This is your Care EveryWhere ID. This could be used by other organizations to access your [place] medical records  [id number]        Your Vitals Were     Last Period BMI (Body Mass Index)                10/11/2017 42.13 kg/m2           Blood Pressure from Last 3 Encounters:   06/05/18 128/74   05/22/18 116/66   05/08/18 126/74    Weight from Last 3 Encounters:   06/05/18 261 lb (118.4 kg)   05/22/18 258 lb (117 kg)   05/08/18 257 lb (116.6 kg)              Today, you had the following     No orders found for display       Primary Care Provider Office Phone # Fax #    [facility] [place] [facility] [phone]       [address]  [facility] [address]        Equal Access to Services     KYLE ROCHA : Hadii aad ku hadasho Socherryali, waaxda luqadaha, qaybta kaalmada adeegyada, yadira sellers. So [facility] [phone].    ATENCIÓN: Si habla español, tiene a mcrae disposición servicios gratuitos de asistencia lingüística. Fernanda al [phone].    We comply with applicable federal civil rights laws and [place] laws. We do not discriminate on the basis of race, color, national origin, age, disability, sex, sexual orientation, or gender identity.            Thank you!     Thank you for choosing [facility] [place]  for your care. Our goal is always to provide you with excellent care. Hearing back from our patients is one way we can continue to improve our services. Please take a few minutes to complete the written survey that you may receive in the  mail after your visit with us. Thank you!             Your Updated Medication List - Protect others around you: Learn how to safely use, store and throw away your medicines at www.disposemymeds.org.          This list is accurate as of 6/5/18  9:28 AM.  Always use your most recent med list.                   Brand Name Dispense Instructions for use Diagnosis    levothyroxine 50 MCG tablet    SYNTHROID    90 tablet    Take 1 tablet (50 mcg) by mouth daily    Supervision of high risk pregnancy in first trimester       prenatal multivitamin plus iron 27-0.8 MG Tabs per tablet      Take 1 tablet by mouth daily    Supervision of high risk pregnancy in first trimester       VITAMIN D-3 PO      Take 4,000 Units by mouth

## 2018-06-05 NOTE — PROGRESS NOTES
Results discussed directly with patient while patient was present. Any further details documented in the note.   SINCERE Littlejohn CNM

## 2018-06-19 ENCOUNTER — PRENATAL OFFICE VISIT (OUTPATIENT)
Dept: MIDWIFE SERVICES | Facility: CLINIC | Age: 33
End: 2018-06-19
Payer: COMMERCIAL

## 2018-06-19 VITALS — DIASTOLIC BLOOD PRESSURE: 68 MMHG | SYSTOLIC BLOOD PRESSURE: 122 MMHG | BODY MASS INDEX: 42.13 KG/M2 | WEIGHT: 261 LBS

## 2018-06-19 DIAGNOSIS — O99.213 OBESITY AFFECTING PREGNANCY IN THIRD TRIMESTER: ICD-10-CM

## 2018-06-19 DIAGNOSIS — Z3A.34 34 WEEKS GESTATION OF PREGNANCY: ICD-10-CM

## 2018-06-19 DIAGNOSIS — O09.93 SUPERVISION OF HIGH RISK PREGNANCY IN THIRD TRIMESTER: Primary | ICD-10-CM

## 2018-06-19 PROCEDURE — 99207 ZZC PRENATAL VISIT: CPT | Performed by: ADVANCED PRACTICE MIDWIFE

## 2018-06-19 NOTE — PROGRESS NOTES
"Feels well, \"fat feet and sore back\".  Encouraged good hydration, comfort measures   Fetal Movement: positive, denies loss of fluid/vb, a lot of talib orellana contractions, occasional \"real\" contractions'  Last time was 15 days late, had to be induced but was 2 cm  Fetal kick counts/movement reviewed  Reviewed PTL precautions and s/sx of preeclampsia; denies any S&S and aware of what to report  Depression screening done yes-mood is stable  Baby Box given:  Decided she didn't need one  Taking hospital tour?  Yes  Have car seat Yes  Discussed GBS/cx check at next visit  Return to clinic 2 weeks    SINCERE Ireland, KRYSTYNA        "

## 2018-06-19 NOTE — MR AVS SNAPSHOT
After Visit Summary   6/19/2018    Elizabeth Crooks    MRN: [id number]           Patient Information     Date Of Birth          1985        Visit Information        Provider Department      6/19/2018 8:00 AM Anupama Byers APRN CNM [facility] [place]        Today's Diagnoses     Supervision of high risk pregnancy in third trimester    -  1    Obesity affecting pregnancy in third trimester        34 weeks gestation of pregnancy           Follow-ups after your visit        Follow-up notes from your care team     Return in about 2 weeks (around 7/3/2018).      Your next 10 appointments already scheduled     Jul 03, 2018  9:00 AM CDT   ESTABLISHED PRENATAL with SINCERE Krishnan CNM   [facility] [place] ([facility] [place])    [address]  [place] [id number] [phone]            Jul 10, 2018  9:00 AM CDT   ESTABLISHED PRENATAL with SINCERE Krishnan CNM   [facility] [place] ([facility] [place])    [address]  [place] [id number] [phone]              Who to contact     If you have questions or need follow up information about today's clinic visit or your schedule please contact [facility] [place] directly at [phone].  Normal or non-critical lab and imaging results will be communicated to you by MyChart, letter or phone within 4 business days after the clinic has received the results. If you do not hear from us within 7 days, please contact the clinic through MyChart or phone. If you have a critical or abnormal lab result, we will notify you by phone as soon as possible.  Submit refill requests through [employer] or call your pharmacy and they will forward the refill request to us. Please allow 3 business days for your refill to be completed.          Additional Information About Your Visit        MyChart Information     [employer] gives  you secure access to your electronic health record. If you see a primary care provider, you can also send messages to your care team and make appointments. If you have questions, please call your primary care clinic.  If you do not have a primary care provider, please call [phone] and they will assist you.        Care EveryWhere ID     This is your Care EveryWhere ID. This could be used by other organizations to access your [place] medical records  [id number]        Your Vitals Were     Last Period BMI (Body Mass Index)                10/11/2017 42.13 kg/m2           Blood Pressure from Last 3 Encounters:   06/19/18 122/68   06/05/18 128/74   05/22/18 116/66    Weight from Last 3 Encounters:   06/19/18 261 lb (118.4 kg)   06/05/18 261 lb (118.4 kg)   05/22/18 258 lb (117 kg)              Today, you had the following     No orders found for display       Primary Care Provider Office Phone # Fax #    [facility] [place] [facility] [phone]       [address]  [facility] [address]        Equal Access to Services     KYLE ROCHA : Hadii aad ku hadasho Soran, waaxda luqadaha, qaybta kaalmada adeegyada, yadira sullivan . So [facility] [phone].    ATENCIÓN: Si habla español, tiene a mcrae disposición servicios gratuitos de asistencia lingüística. Llame al [phone].    We comply with applicable federal civil rights laws and [place] laws. We do not discriminate on the basis of race, color, national origin, age, disability, sex, sexual orientation, or gender identity.            Thank you!     Thank you for choosing [facility] [place]  for your care. Our goal is always to provide you with excellent care. Hearing back from our patients is one way we can continue to improve our services. Please take a few minutes to complete the written survey that you may receive in the mail after your visit with us.  Thank you!             Your Updated Medication List - Protect others around you: Learn how to safely use, store and throw away your medicines at www.disposemymeds.org.          This list is accurate as of 6/19/18  8:35 AM.  Always use your most recent med list.                   Brand Name Dispense Instructions for use Diagnosis    levothyroxine 50 MCG tablet    SYNTHROID    90 tablet    Take 1 tablet (50 mcg) by mouth daily    Supervision of high risk pregnancy in first trimester       prenatal multivitamin plus iron 27-0.8 MG Tabs per tablet      Take 1 tablet by mouth daily    Supervision of high risk pregnancy in first trimester       VITAMIN D-3 PO      Take 4,000 Units by mouth

## 2018-07-10 ENCOUNTER — PRENATAL OFFICE VISIT (OUTPATIENT)
Dept: MIDWIFE SERVICES | Facility: CLINIC | Age: 33
End: 2018-07-10
Payer: COMMERCIAL

## 2018-07-10 VITALS — SYSTOLIC BLOOD PRESSURE: 116 MMHG | DIASTOLIC BLOOD PRESSURE: 74 MMHG | WEIGHT: 265 LBS | BODY MASS INDEX: 42.77 KG/M2

## 2018-07-10 DIAGNOSIS — Z36.85 ANTENATAL SCREENING FOR STREPTOCOCCUS B: Primary | ICD-10-CM

## 2018-07-10 DIAGNOSIS — Z3A.37 37 WEEKS GESTATION OF PREGNANCY: ICD-10-CM

## 2018-07-10 DIAGNOSIS — O99.213 OBESITY AFFECTING PREGNANCY IN THIRD TRIMESTER: ICD-10-CM

## 2018-07-10 PROCEDURE — 87186 SC STD MICRODIL/AGAR DIL: CPT | Performed by: ADVANCED PRACTICE MIDWIFE

## 2018-07-10 PROCEDURE — 99207 ZZC PRENATAL VISIT: CPT | Performed by: ADVANCED PRACTICE MIDWIFE

## 2018-07-10 PROCEDURE — 87653 STREP B DNA AMP PROBE: CPT | Performed by: ADVANCED PRACTICE MIDWIFE

## 2018-07-10 NOTE — MR AVS SNAPSHOT
"              After Visit Summary   7/10/2018    Elizabeth Crooks    MRN: [id number]           Patient Information     Date Of Birth          1985        Visit Information        Provider Department      7/10/2018 9:00 AM Tabatha Ortega APRN CNM [facility]        Today's Diagnoses      screening for streptococcus B    -  1    Obesity affecting pregnancy in third trimester        37 weeks gestation of pregnancy          Care Instructions    Labor Instructions for Midwife Patients    When to call:  Both during and after office hours call  [phone]. There is a triage RN to take your calls and answer your questions 24 hours a day.  If she cannot answer your question she will page the midwife on call for you.    When to call:  Call anytime you have important concerns about you or your baby.     Call if:    You are having contractions at regular intervals about 5-6 minutes apart lasting 30-60 seconds and becoming increasingly more intense     You have an uncontrollable gush of fluid from your vagina or feel a pop and gush like your water has broken    You have HEAVY bleeding, like heavy period, blood running down your legs, or  soaking a pad.     Some bleeding after a pelvic exam, after intercourse, or in labor when your cervix is dilating is normal and is referred to as \"bloody show\"    You have severe, continuous back or abdominal pain    You feel it is time to go to the hospital    If this is your first labor, call when contractions are very intense and have been about every 3-4 minutes for about an hour    If it is your second labor or more, call when contractions are strong and about every 3-5 minutes or sooner depending on your level of discomfort.     Keep in mind we are always here for you! If you have questions, concerns please don't hesitate to call us.     What to eat/drink in labor: Drink plenty of fluid (water most importantly, juice, soda or tea without " caffeine). Eat rice, pasta, soup, cereal, bread/toast, and fruit. Avoid dairy and greasy food as they are difficult to digest and you may experience some nausea during labor.    Comfort measures:    Baths and showers (ok even with ruptured membranes, it may temporarily slow contractions if you are still in the early stage of labor)    Warm/hot packs for back pain or discomfort    Back, belly, or thigh massages    Standing, rocking, walking, leaning over bed or tables, side-lying and sleeping    Miscellaneous:     Contractions are timed from the beginning of one to the beginning of the next    Try hard to sleep during the early stage of labor when you are not that uncomfortable. Timing of contractions at this point is not important    Even if you cannot sleep, resting in bed or on the couch can help you maintain your energy for labor    When you arrive at the hospital the nurse will check your baby's heartbeat, check your cervix, and will call us. The midwife on call will come in and be with you when you are in active labor    After hours you need to enter the hospital through the emergency room    NATURAL LABOR PREPARATION    So, you're getting closer and closer to your due date and wondering what you can do to help get your body ready for labor.   Here are some natural methods you can try:    NOTE: DO NOT begin any of the following prior to 36 completed weeks of pregnancy!    Evening Primrose Oil: Take 1000mg by mouth three times per day. This encourages the cervix to ripen. Cervical ripening is when your cervix becomes more soft and stretchy to get ready for dilation and effacement.     Red Raspberry Leaf Tea: Red raspberry tea (get it at a Co-op or in the grocery store). Drink three cups per day (hot or cold). This can help to prepare the uterine muscles for labor.    Fetal Kick Counts    It is important to know when your baby's movements occur. We often get busy with work and life and do not pay close attention  to their movements.        Women typically begin feeling movement between 18-22 weeks of gestation, sometimes it can be earlier or later depending on where your placenta is       Movements usually begin feeling like popping or fluttering and as the baby grows they become more pronounced    Toward the end of pregnancy as the baby gets larger they may not move as much or make as big of movements. Babies have maturing sleep cycles as well as not as much room to move and flip. If you are ever concerned about your baby's movements or have not felt the baby move for a while, we recommend you do a fetal kick count. Prior to starting your count drink a glass of water or juice and eat a snack. Then lay down on your side and begin to count movements.     How to do a Fetal Kick Counts    There are many different ways to monitor your baby's movements. Movements can range from large jabs to small kicks, or wiggles.  Hiccups count!      Count 10 movements in 2 hours when resting and focusing    Count 10 movements in 12 hours when doing normal activity    We recommend that if movements occur but seem decreased that you should be seen in the clinic or hospital for evaluation within 12 hours. If fetal movement is absent or fetal kick counts are low please contact us right away.    If you ever have any concerns about your baby's movements DO NOT HESITATE to call us, we are here for you!    [facility]  [phone]    PREECLAMPSIA SIGNS AND SYMPTOMS    Preeclampsia is a dangerous condition that some women develop in the second half of pregnancy. It can also begin after the baby is born.  Preeclampsia causes high blood pressure and can cause problems with many organ systems in your body.  It can also affect the growth of your baby. The exact cause of preeclampsia is unknown, however, there are signs and symptoms to watch for:    -A bad headache that doesn't improve with Tylenol  -Visual changes such as spots, flashes of  "light, blurry vision  -Pain in the upper right part of your abdomen, especially under the ribs  -Nausea and/or vomiting  -Feeling extremely tired  -Yellowing of the skin and/or eyes  -Feeling \"not quite right\" or that something is wrong  -An extreme amount of swelling (some swelling in pregnancy is very normal)    If your midwife feels that you are developing preeclampsia, you will have lab tests drawn and will be monitored very closely.     If you are experiencing these symptoms, please call the [facility] immediately at [phone].          Follow-ups after your visit        Follow-up notes from your care team     Return in about 1 week (around 7/17/2018) for Prenatal Visit.      Your next 10 appointments already scheduled     Jul 17, 2018  9:30 AM CDT   ESTABLISHED PRENATAL with SINCERE Rodas CNM   [facility] [place] ([facility] [place])    [address] [place] [id number] [phone]            Jul 24, 2018  9:00 AM CDT   ESTABLISHED PRENATAL with SINCERE Rodas CNM   [facility] [place] ([facility] [place])    [address] [place] [id number] [phone]              Who to contact     If you have questions or need follow up information about today's clinic visit or your schedule please contact [facility] [place] directly at [phone].  Normal or non-critical lab and imaging results will be communicated to you by MyChart, letter or phone within 4 business days after the clinic has received the results. If you do not hear from us within 7 days, please contact the clinic through MyChart or phone. If you have a critical or abnormal lab result, we will notify you by phone as soon as possible.  Submit refill requests through [employer] or call your pharmacy and they will forward the refill request to us. Please allow 3 business days for your refill to be " completed.          Additional Information About Your Visit        [employer] Information     [employer] gives you secure access to your electronic health record. If you see a primary care provider, you can also send messages to your care team and make appointments. If you have questions, please call your primary care clinic.  If you do not have a primary care provider, please call [phone] and they will assist you.        Care EveryWhere ID     This is your Care EveryWhere ID. This could be used by other organizations to access your [place] medical records  [id number]        Your Vitals Were     Last Period BMI (Body Mass Index)                10/11/2017 42.77 kg/m2           Blood Pressure from Last 3 Encounters:   07/10/18 116/74   06/19/18 122/68   06/05/18 128/74    Weight from Last 3 Encounters:   07/10/18 265 lb (120.2 kg)   06/19/18 261 lb (118.4 kg)   06/05/18 261 lb (118.4 kg)              We Performed the Following     Group B strep PCR        Primary Care Provider Office Phone # Fax #    [facility] [phone]       [address]  [facility] [address]        Equal Access to Services     KYLE ROCHA : Hadii aad ku hadasho Soomaali, waaxda luqadaha, qaybta kaalmada adeegyada, yadira sellers. So [facility] [phone].    ATENCIÓN: Si habla español, tiene a mcrae disposición servicios gratuitos de asistencia lingüística. Fernanda al [phone].    We comply with applicable federal civil rights laws and [place] laws. We do not discriminate on the basis of race, color, national origin, age, disability, sex, sexual orientation, or gender identity.            Thank you!     Thank you for choosing [facility]  for your care. Our goal is always to provide you with excellent care. Hearing back from our patients is one way we can continue to improve our services. Please take a few minutes to complete the written survey that you may  receive in the mail after your visit with us. Thank you!             Your Updated Medication List - Protect others around you: Learn how to safely use, store and throw away your medicines at www.disposemymeds.org.          This list is accurate as of 7/10/18  9:36 AM.  Always use your most recent med list.                   Brand Name Dispense Instructions for use Diagnosis    levothyroxine 50 MCG tablet    SYNTHROID    90 tablet    Take 1 tablet (50 mcg) by mouth daily    Supervision of high risk pregnancy in first trimester       prenatal multivitamin plus iron 27-0.8 MG Tabs per tablet      Take 1 tablet by mouth daily    Supervision of high risk pregnancy in first trimester       VITAMIN D-3 PO      Take 4,000 Units by mouth

## 2018-07-10 NOTE — PATIENT INSTRUCTIONS
"Labor Instructions for Midwife Patients    When to call:  Both during and after office hours call  [phone]. There is a triage RN to take your calls and answer your questions 24 hours a day.  If she cannot answer your question she will page the midwife on call for you.    When to call:  Call anytime you have important concerns about you or your baby.     Call if:    You are having contractions at regular intervals about 5-6 minutes apart lasting 30-60 seconds and becoming increasingly more intense     You have an uncontrollable gush of fluid from your vagina or feel a pop and gush like your water has broken    You have HEAVY bleeding, like heavy period, blood running down your legs, or  soaking a pad.     Some bleeding after a pelvic exam, after intercourse, or in labor when your cervix is dilating is normal and is referred to as \"bloody show\"    You have severe, continuous back or abdominal pain    You feel it is time to go to the hospital    If this is your first labor, call when contractions are very intense and have been about every 3-4 minutes for about an hour    If it is your second labor or more, call when contractions are strong and about every 3-5 minutes or sooner depending on your level of discomfort.     Keep in mind we are always here for you! If you have questions, concerns please don't hesitate to call us.     What to eat/drink in labor: Drink plenty of fluid (water most importantly, juice, soda or tea without caffeine). Eat rice, pasta, soup, cereal, bread/toast, and fruit. Avoid dairy and greasy food as they are difficult to digest and you may experience some nausea during labor.    Comfort measures:    Baths and showers (ok even with ruptured membranes, it may temporarily slow contractions if you are still in the early stage of labor)    Warm/hot packs for back pain or discomfort    Back, belly, or thigh massages    Standing, rocking, walking, leaning over bed or tables, side-lying and " sleeping    Miscellaneous:     Contractions are timed from the beginning of one to the beginning of the next    Try hard to sleep during the early stage of labor when you are not that uncomfortable. Timing of contractions at this point is not important    Even if you cannot sleep, resting in bed or on the couch can help you maintain your energy for labor    When you arrive at the hospital the nurse will check your baby's heartbeat, check your cervix, and will call us. The midwife on call will come in and be with you when you are in active labor    After hours you need to enter the hospital through the emergency room    NATURAL LABOR PREPARATION    So, you're getting closer and closer to your due date and wondering what you can do to help get your body ready for labor.   Here are some natural methods you can try:    NOTE: DO NOT begin any of the following prior to 36 completed weeks of pregnancy!    Evening Primrose Oil: Take 1000mg by mouth three times per day. This encourages the cervix to ripen. Cervical ripening is when your cervix becomes more soft and stretchy to get ready for dilation and effacement.     Red Raspberry Leaf Tea: Red raspberry tea (get it at a Co-op or in the grocery store). Drink three cups per day (hot or cold). This can help to prepare the uterine muscles for labor.    Fetal Kick Counts    It is important to know when your baby's movements occur. We often get busy with work and life and do not pay close attention to their movements.        Women typically begin feeling movement between 18-22 weeks of gestation, sometimes it can be earlier or later depending on where your placenta is       Movements usually begin feeling like popping or fluttering and as the baby grows they become more pronounced    Toward the end of pregnancy as the baby gets larger they may not move as much or make as big of movements. Babies have maturing sleep cycles as well as not as much room to move and flip. If you are  "ever concerned about your baby's movements or have not felt the baby move for a while, we recommend you do a fetal kick count. Prior to starting your count drink a glass of water or juice and eat a snack. Then lay down on your side and begin to count movements.     How to do a Fetal Kick Counts    There are many different ways to monitor your baby's movements. Movements can range from large jabs to small kicks, or wiggles.  Hiccups count!      Count 10 movements in 2 hours when resting and focusing    Count 10 movements in 12 hours when doing normal activity    We recommend that if movements occur but seem decreased that you should be seen in the clinic or hospital for evaluation within 12 hours. If fetal movement is absent or fetal kick counts are low please contact us right away.    If you ever have any concerns about your baby's movements DO NOT HESITATE to call us, we are here for you!    [facility]  [phone]    PREECLAMPSIA SIGNS AND SYMPTOMS    Preeclampsia is a dangerous condition that some women develop in the second half of pregnancy. It can also begin after the baby is born.  Preeclampsia causes high blood pressure and can cause problems with many organ systems in your body.  It can also affect the growth of your baby. The exact cause of preeclampsia is unknown, however, there are signs and symptoms to watch for:    -A bad headache that doesn't improve with Tylenol  -Visual changes such as spots, flashes of light, blurry vision  -Pain in the upper right part of your abdomen, especially under the ribs  -Nausea and/or vomiting  -Feeling extremely tired  -Yellowing of the skin and/or eyes  -Feeling \"not quite right\" or that something is wrong  -An extreme amount of swelling (some swelling in pregnancy is very normal)    If your midwife feels that you are developing preeclampsia, you will have lab tests drawn and will be monitored very closely.     If you are experiencing these symptoms, " please call the [facility] immediately at [phone].

## 2018-07-10 NOTE — PROGRESS NOTES
"Feels well overall except, \"Hips starting to pop, annoying\".   Fetal movement: positive  Denies vaginal bleeding/lof, some contractions. Usually ctx at bedtime, never turn into anything  GBS swab done today. Will call with results  SVE: Unable to reach cervix  Vertex, verified by bedside US.  Warning signs reviewed  Labor signs and symptoms discussed, aware of numbers to call  Denies s/sx of preeclampsia and aware of what to report  Plans for birth control after baby: Unsure at this time, had IUD, that expelled, resulted in this current pregnancy. Interested in Nuvaring, has used in the past.     Return to clinic 1 week    Tabatha POST CNM    "

## 2018-07-11 ENCOUNTER — MYC MEDICAL ADVICE (OUTPATIENT)
Dept: MIDWIFE SERVICES | Facility: CLINIC | Age: 33
End: 2018-07-11

## 2018-07-11 LAB
GP B STREP DNA SPEC QL NAA+PROBE: POSITIVE
SPECIMEN SOURCE: ABNORMAL

## 2018-07-14 LAB
BACTERIA SPEC CULT: ABNORMAL
SPECIMEN SOURCE: ABNORMAL

## 2018-07-17 ENCOUNTER — PRENATAL OFFICE VISIT (OUTPATIENT)
Dept: MIDWIFE SERVICES | Facility: CLINIC | Age: 33
End: 2018-07-17
Payer: COMMERCIAL

## 2018-07-17 VITALS — BODY MASS INDEX: 42.58 KG/M2 | WEIGHT: 263.8 LBS | SYSTOLIC BLOOD PRESSURE: 130 MMHG | DIASTOLIC BLOOD PRESSURE: 66 MMHG

## 2018-07-17 DIAGNOSIS — O09.93 SUPERVISION OF HIGH RISK PREGNANCY IN THIRD TRIMESTER: ICD-10-CM

## 2018-07-17 DIAGNOSIS — O99.213 OBESITY AFFECTING PREGNANCY IN THIRD TRIMESTER: ICD-10-CM

## 2018-07-17 DIAGNOSIS — Z3A.38 38 WEEKS GESTATION OF PREGNANCY: Primary | ICD-10-CM

## 2018-07-17 PROCEDURE — 99207 ZZC PRENATAL VISIT: CPT | Performed by: ADVANCED PRACTICE MIDWIFE

## 2018-07-17 NOTE — PROGRESS NOTES
"Done with work, still getting rest  Fetal movement: positive  Denies vaginal bleeding/lof, some contractions. \"Typical early ctxs,\" These are starting to group together, thinks something is happening, then no.\" Having diarrhea. Has been happening for a couple days.\" \"This is just wearing\"  SVE: 1.5/50/-3 Vtx  Warning signs reviewed   Labor signs and symptoms discussed, aware of numbers to call  Denies s/sx of pre-eclampsia and aware of what to report    Return to clinic 1 week    Tabatha POST CNM            "

## 2018-07-17 NOTE — MR AVS SNAPSHOT
"              After Visit Summary   7/17/2018    Elizabeth Crooks    MRN: [id number]           Patient Information     Date Of Birth          1985        Visit Information        Provider Department      7/17/2018 9:30 AM Tabatha Ortega APRN CNM [facility]        Today's Diagnoses     38 weeks gestation of pregnancy    -  1    Obesity affecting pregnancy in third trimester        Supervision of high risk pregnancy in third trimester          Care Instructions    Labor Instructions for Midwife Patients    When to call:  Both during and after office hours call  [phone]. There is a triage RN to take your calls and answer your questions 24 hours a day.  If she cannot answer your question she will page the midwife on call for you.    When to call:  Call anytime you have important concerns about you or your baby.     Call if:    You are having contractions at regular intervals about 5-6 minutes apart lasting 30-60 seconds and becoming increasingly more intense     You have an uncontrollable gush of fluid from your vagina or feel a pop and gush like your water has broken    You have HEAVY bleeding, like heavy period, blood running down your legs, or  soaking a pad.     Some bleeding after a pelvic exam, after intercourse, or in labor when your cervix is dilating is normal and is referred to as \"bloody show\"    You have severe, continuous back or abdominal pain    You feel it is time to go to the hospital    If this is your first labor, call when contractions are very intense and have been about every 3-4 minutes for about an hour    If it is your second labor or more, call when contractions are strong and about every 3-5 minutes or sooner depending on your level of discomfort.     Keep in mind we are always here for you! If you have questions, concerns please don't hesitate to call us.     What to eat/drink in labor: Drink plenty of fluid (water most importantly, juice, soda or tea " without caffeine). Eat rice, pasta, soup, cereal, bread/toast, and fruit. Avoid dairy and greasy food as they are difficult to digest and you may experience some nausea during labor.    Comfort measures:    Baths and showers (ok even with ruptured membranes, it may temporarily slow contractions if you are still in the early stage of labor)    Warm/hot packs for back pain or discomfort    Back, belly, or thigh massages    Standing, rocking, walking, leaning over bed or tables, side-lying and sleeping    Miscellaneous:     Contractions are timed from the beginning of one to the beginning of the next    Try hard to sleep during the early stage of labor when you are not that uncomfortable. Timing of contractions at this point is not important    Even if you cannot sleep, resting in bed or on the couch can help you maintain your energy for labor    When you arrive at the hospital the nurse will check your baby's heartbeat, check your cervix, and will call us. The midwife on call will come in and be with you when you are in active labor    After hours you need to enter the hospital through the emergency room    NATURAL LABOR PREPARATION    So, you're getting closer and closer to your due date and wondering what you can do to help get your body ready for labor.   Here are some natural methods you can try:    NOTE: DO NOT begin any of the following prior to 36 completed weeks of pregnancy!    Evening Primrose Oil: Take 1000mg by mouth three times per day. This encourages the cervix to ripen. Cervical ripening is when your cervix becomes more soft and stretchy to get ready for dilation and effacement.     Red Raspberry Leaf Tea: Red raspberry tea (get it at a Co-op or in the grocery store). Drink three cups per day (hot or cold). This can help to prepare the uterine muscles for labor.    Fetal Kick Counts    It is important to know when your baby's movements occur. We often get busy with work and life and do not pay close  attention to their movements.        Women typically begin feeling movement between 18-22 weeks of gestation, sometimes it can be earlier or later depending on where your placenta is       Movements usually begin feeling like popping or fluttering and as the baby grows they become more pronounced    Toward the end of pregnancy as the baby gets larger they may not move as much or make as big of movements. Babies have maturing sleep cycles as well as not as much room to move and flip. If you are ever concerned about your baby's movements or have not felt the baby move for a while, we recommend you do a fetal kick count. Prior to starting your count drink a glass of water or juice and eat a snack. Then lay down on your side and begin to count movements.     How to do a Fetal Kick Counts    There are many different ways to monitor your baby's movements. Movements can range from large jabs to small kicks, or wiggles.  Hiccups count!      Count 10 movements in 2 hours when resting and focusing    Count 10 movements in 12 hours when doing normal activity    We recommend that if movements occur but seem decreased that you should be seen in the clinic or hospital for evaluation within 12 hours. If fetal movement is absent or fetal kick counts are low please contact us right away.    If you ever have any concerns about your baby's movements DO NOT HESITATE to call us, we are here for you!    [facility]  [phone]      PREECLAMPSIA SIGNS AND SYMPTOMS    Preeclampsia is a dangerous condition that some women develop in the second half of pregnancy. It can also begin after the baby is born.  Preeclampsia causes high blood pressure and can cause problems with many organ systems in your body.  It can also affect the growth of your baby. The exact cause of preeclampsia is unknown, however, there are signs and symptoms to watch for:    -A bad headache that doesn't improve with Tylenol  -Visual changes such as spots,  "flashes of light, blurry vision  -Pain in the upper right part of your abdomen, especially under the ribs that doesn't go away  -Nausea and/or vomiting  -Feeling extremely tired  -Yellowing of the skin and/or eyes  -Feeling \"not quite right\" or that something is wrong  -An extreme amount of swelling (some swelling in pregnancy is very normal)    If your midwife feels that you are developing preeclampsia, you will have lab tests drawn and will be monitored very closely.     If you are experiencing anyof these symptoms, call the [facility] immediately at [phone].              Follow-ups after your visit        Follow-up notes from your care team     Return in about 1 week (around 7/24/2018) for Prenatal Visit.      Your next 10 appointments already scheduled     Jul 24, 2018  9:00 AM CDT   ESTABLISHED PRENATAL with SINCERE Rodas CNM   [facility] ([facility] [place])    [address] [place] [id number] [phone]              Who to contact     If you have questions or need follow up information about today's clinic visit or your schedule please contact [facility] directly at [phone].  Normal or non-critical lab and imaging results will be communicated to you by [employer], letter or phone within 4 business days after the clinic has received the results. If you do not hear from us within 7 days, please contact the clinic through [employer] or phone. If you have a critical or abnormal lab result, we will notify you by phone as soon as possible.  Submit refill requests through [employer] or call your pharmacy and they will forward the refill request to us. Please allow 3 business days for your refill to be completed.          Additional Information About Your Visit        [employer] Information     [employer] gives you secure access to your electronic health record. If you see a primary care provider, you can also " send messages to your care team and make appointments. If you have questions, please call your primary care clinic.  If you do not have a primary care provider, please call [phone] and they will assist you.        Care EveryWhere ID     This is your Care EveryWhere ID. This could be used by other organizations to access your [facility] medical records  [id number]        Your Vitals Were     Last Period BMI (Body Mass Index)                10/11/2017 42.58 kg/m2           Blood Pressure from Last 3 Encounters:   07/17/18 130/66   07/10/18 116/74   06/19/18 122/68    Weight from Last 3 Encounters:   07/17/18 263 lb 12.8 oz (119.7 kg)   07/10/18 265 lb (120.2 kg)   06/19/18 261 lb (118.4 kg)              Today, you had the following     No orders found for display       Primary Care Provider Office Phone # Fax #    [facility] [phone] [address]  [facility] [address]        Equal Access to Services     [facility] : Hadii aad ku hadasho Soomaali, waaxda luqadaha, qaybta kaalmada adeegyada, waxay idiin haydavid sullivan . So [facility] [phone].    ATENCIÓN: Si habla español, tiene a mcrae disposición servicios gratuitos de asistencia lingüística. Llame al [phone].    We comply with applicable federal civil rights laws and [place] laws. We do not discriminate on the basis of race, color, national origin, age, disability, sex, sexual orientation, or gender identity.            Thank you!     Thank you for choosing [facility]  for your care. Our goal is always to provide you with excellent care. Hearing back from our patients is one way we can continue to improve our services. Please take a few minutes to complete the written survey that you may receive in the mail after your visit with us. Thank you!             Your Updated Medication List - Protect others around you: Learn how to safely use, store and throw away your medicines at  www.disposemymeds.org.          This list is accurate as of 7/17/18 10:06 AM.  Always use your most recent med list.                   Brand Name Dispense Instructions for use Diagnosis    levothyroxine 50 MCG tablet    SYNTHROID    90 tablet    Take 1 tablet (50 mcg) by mouth daily    Supervision of high risk pregnancy in first trimester       prenatal multivitamin plus iron 27-0.8 MG Tabs per tablet      Take 1 tablet by mouth daily    Supervision of high risk pregnancy in first trimester       VITAMIN D-3 PO      Take 4,000 Units by mouth

## 2018-07-17 NOTE — PATIENT INSTRUCTIONS
"Labor Instructions for Midwife Patients    When to call:  Both during and after office hours call  [phone]. There is a triage RN to take your calls and answer your questions 24 hours a day.  If she cannot answer your question she will page the midwife on call for you.    When to call:  Call anytime you have important concerns about you or your baby.     Call if:    You are having contractions at regular intervals about 5-6 minutes apart lasting 30-60 seconds and becoming increasingly more intense     You have an uncontrollable gush of fluid from your vagina or feel a pop and gush like your water has broken    You have HEAVY bleeding, like heavy period, blood running down your legs, or  soaking a pad.     Some bleeding after a pelvic exam, after intercourse, or in labor when your cervix is dilating is normal and is referred to as \"bloody show\"    You have severe, continuous back or abdominal pain    You feel it is time to go to the hospital    If this is your first labor, call when contractions are very intense and have been about every 3-4 minutes for about an hour    If it is your second labor or more, call when contractions are strong and about every 3-5 minutes or sooner depending on your level of discomfort.     Keep in mind we are always here for you! If you have questions, concerns please don't hesitate to call us.     What to eat/drink in labor: Drink plenty of fluid (water most importantly, juice, soda or tea without caffeine). Eat rice, pasta, soup, cereal, bread/toast, and fruit. Avoid dairy and greasy food as they are difficult to digest and you may experience some nausea during labor.    Comfort measures:    Baths and showers (ok even with ruptured membranes, it may temporarily slow contractions if you are still in the early stage of labor)    Warm/hot packs for back pain or discomfort    Back, belly, or thigh massages    Standing, rocking, walking, leaning over bed or tables, side-lying and " sleeping    Miscellaneous:     Contractions are timed from the beginning of one to the beginning of the next    Try hard to sleep during the early stage of labor when you are not that uncomfortable. Timing of contractions at this point is not important    Even if you cannot sleep, resting in bed or on the couch can help you maintain your energy for labor    When you arrive at the hospital the nurse will check your baby's heartbeat, check your cervix, and will call us. The midwife on call will come in and be with you when you are in active labor    After hours you need to enter the hospital through the emergency room    NATURAL LABOR PREPARATION    So, you're getting closer and closer to your due date and wondering what you can do to help get your body ready for labor.   Here are some natural methods you can try:    NOTE: DO NOT begin any of the following prior to 36 completed weeks of pregnancy!    Evening Primrose Oil: Take 1000mg by mouth three times per day. This encourages the cervix to ripen. Cervical ripening is when your cervix becomes more soft and stretchy to get ready for dilation and effacement.     Red Raspberry Leaf Tea: Red raspberry tea (get it at a Co-op or in the grocery store). Drink three cups per day (hot or cold). This can help to prepare the uterine muscles for labor.    Fetal Kick Counts    It is important to know when your baby's movements occur. We often get busy with work and life and do not pay close attention to their movements.        Women typically begin feeling movement between 18-22 weeks of gestation, sometimes it can be earlier or later depending on where your placenta is       Movements usually begin feeling like popping or fluttering and as the baby grows they become more pronounced    Toward the end of pregnancy as the baby gets larger they may not move as much or make as big of movements. Babies have maturing sleep cycles as well as not as much room to move and flip. If you are  "ever concerned about your baby's movements or have not felt the baby move for a while, we recommend you do a fetal kick count. Prior to starting your count drink a glass of water or juice and eat a snack. Then lay down on your side and begin to count movements.     How to do a Fetal Kick Counts    There are many different ways to monitor your baby's movements. Movements can range from large jabs to small kicks, or wiggles.  Hiccups count!      Count 10 movements in 2 hours when resting and focusing    Count 10 movements in 12 hours when doing normal activity    We recommend that if movements occur but seem decreased that you should be seen in the clinic or hospital for evaluation within 12 hours. If fetal movement is absent or fetal kick counts are low please contact us right away.    If you ever have any concerns about your baby's movements DO NOT HESITATE to call us, we are here for you!    [facility]  [phone]      PREECLAMPSIA SIGNS AND SYMPTOMS    Preeclampsia is a dangerous condition that some women develop in the second half of pregnancy. It can also begin after the baby is born.  Preeclampsia causes high blood pressure and can cause problems with many organ systems in your body.  It can also affect the growth of your baby. The exact cause of preeclampsia is unknown, however, there are signs and symptoms to watch for:    -A bad headache that doesn't improve with Tylenol  -Visual changes such as spots, flashes of light, blurry vision  -Pain in the upper right part of your abdomen, especially under the ribs that doesn't go away  -Nausea and/or vomiting  -Feeling extremely tired  -Yellowing of the skin and/or eyes  -Feeling \"not quite right\" or that something is wrong  -An extreme amount of swelling (some swelling in pregnancy is very normal)    If your midwife feels that you are developing preeclampsia, you will have lab tests drawn and will be monitored very closely.     If you are " experiencing anyof these symptoms, call the [facility] immediately at [phone].

## 2018-07-24 ENCOUNTER — PRENATAL OFFICE VISIT (OUTPATIENT)
Dept: MIDWIFE SERVICES | Facility: CLINIC | Age: 33
End: 2018-07-24
Payer: COMMERCIAL

## 2018-07-24 VITALS — SYSTOLIC BLOOD PRESSURE: 122 MMHG | DIASTOLIC BLOOD PRESSURE: 80 MMHG | WEIGHT: 265 LBS | BODY MASS INDEX: 42.77 KG/M2

## 2018-07-24 DIAGNOSIS — O09.93 SUPERVISION OF HIGH RISK PREGNANCY IN THIRD TRIMESTER: Primary | ICD-10-CM

## 2018-07-24 DIAGNOSIS — Z3A.39 39 WEEKS GESTATION OF PREGNANCY: ICD-10-CM

## 2018-07-24 PROCEDURE — 99207 ZZC PRENATAL VISIT: CPT | Performed by: ADVANCED PRACTICE MIDWIFE

## 2018-07-24 NOTE — PATIENT INSTRUCTIONS
"Labor Instructions for Midwife Patients    When to call:  Both during and after office hours call  [phone]. There is a triage RN to take your calls and answer your questions 24 hours a day.  If she cannot answer your question she will page the midwife on call for you.    When to call:  Call anytime you have important concerns about you or your baby.     Call if:    You are having contractions at regular intervals about 5-6 minutes apart lasting 30-60 seconds and becoming increasingly more intense     You have an uncontrollable gush of fluid from your vagina or feel a pop and gush like your water has broken    You have HEAVY bleeding, like heavy period, blood running down your legs, or  soaking a pad.     Some bleeding after a pelvic exam, after intercourse, or in labor when your cervix is dilating is normal and is referred to as \"bloody show\"    You have severe, continuous back or abdominal pain    You feel it is time to go to the hospital    If this is your first labor, call when contractions are very intense and have been about every 3-4 minutes for about an hour    If it is your second labor or more, call when contractions are strong and about every 3-5 minutes or sooner depending on your level of discomfort.     Keep in mind we are always here for you! If you have questions, concerns please don't hesitate to call us.     What to eat/drink in labor: Drink plenty of fluid (water most importantly, juice, soda or tea without caffeine). Eat rice, pasta, soup, cereal, bread/toast, and fruit. Avoid dairy and greasy food as they are difficult to digest and you may experience some nausea during labor.    Comfort measures:    Baths and showers (ok even with ruptured membranes, it may temporarily slow contractions if you are still in the early stage of labor)    Warm/hot packs for back pain or discomfort    Back, belly, or thigh massages    Standing, rocking, walking, leaning over bed or tables, side-lying and " sleeping    Miscellaneous:     Contractions are timed from the beginning of one to the beginning of the next    Try hard to sleep during the early stage of labor when you are not that uncomfortable. Timing of contractions at this point is not important    Even if you cannot sleep, resting in bed or on the couch can help you maintain your energy for labor    When you arrive at the hospital the nurse will check your baby's heartbeat, check your cervix, and will call us. The midwife on call will come in and be with you when you are in active labor    After hours you need to enter the hospital through the emergency room    Fetal Kick Counts    It is important to know when your baby's movements occur. We often get busy with work and life and do not pay close attention to their movements.        Women typically begin feeling movement between 18-22 weeks of gestation, sometimes it can be earlier or later depending on where your placenta is       Movements usually begin feeling like popping or fluttering and as the baby grows they become more pronounced    Toward the end of pregnancy as the baby gets larger they may not move as much or make as big of movements. Babies have maturing sleep cycles as well as not as much room to move and flip. If you are ever concerned about your baby's movements or have not felt the baby move for a while, we recommend you do a fetal kick count. Prior to starting your count drink a glass of water or juice and eat a snack. Then lay down on your side and begin to count movements.     How to do a Fetal Kick Counts    There are many different ways to monitor your baby's movements. Movements can range from large jabs to small kicks, or wiggles.  Hiccups count!      Count 10 movements in 2 hours when resting and focusing    Count 10 movements in 12 hours when doing normal activity    We recommend that if movements occur but seem decreased that you should be seen in the clinic or hospital for  "evaluation within 12 hours. If fetal movement is absent or fetal kick counts are low please contact us right away.    If you ever have any concerns about your baby's movements DO NOT HESITATE to call us, we are here for you!    [facility]  [phone]    NATURAL LABOR PREPARATION    So, you're getting closer and closer to your due date and wondering what you can do to help get your body ready for labor.   Here are some natural methods you can try:    NOTE: DO NOT begin any of the following prior to 36 completed weeks of pregnancy!    Evening Primrose Oil: Take 1000mg by mouth three times per day. This encourages the cervix to ripen. Cervical ripening is when your cervix becomes more soft and stretchy to get ready for dilation and effacement.     Red Raspberry Leaf Tea: Red raspberry tea (get it at a Co-op or in the grocery store). Drink three cups per day (hot or cold). This can help to prepare the uterine muscles for labor.    PREECLAMPSIA SIGNS AND SYMPTOMS    Preeclampsia is a dangerous condition that some women develop in the second half of pregnancy. It can also begin after the baby is born.  Preeclampsia causes high blood pressure and can cause problems with many organ systems in your body.  It can also affect the growth of your baby. The exact cause of preeclampsia is unknown, however, there are signs and symptoms to watch for:    -A bad headache that doesn't improve with Tylenol  -Visual changes such as spots, flashes of light, blurry vision  -Pain in the upper right part of your abdomen, especially under the ribs that doesn't go away  -Nausea and/or vomiting  -Feeling extremely tired  -Yellowing of the skin and/or eyes  -Feeling \"not quite right\" or that something is wrong  -An extreme amount of swelling (some swelling in pregnancy is very normal)    If your midwife feels that you are developing preeclampsia, you will have lab tests drawn and will be monitored very closely.     If you are " experiencing anyof these symptoms, call the [facility] immediately at [phone].

## 2018-07-24 NOTE — MR AVS SNAPSHOT
"              After Visit Summary   7/24/2018    Elizabeth Crooks    MRN: [id number]           Patient Information     Date Of Birth          1985        Visit Information        Provider Department      7/24/2018 9:00 AM Tabatha Ortega APRN CNM [facility]        Today's Diagnoses     Supervision of high risk pregnancy in third trimester    -  1    39 weeks gestation of pregnancy          Care Instructions    Labor Instructions for Midwife Patients    When to call:  Both during and after office hours call  [phone]. There is a triage RN to take your calls and answer your questions 24 hours a day.  If she cannot answer your question she will page the midwife on call for you.    When to call:  Call anytime you have important concerns about you or your baby.     Call if:    You are having contractions at regular intervals about 5-6 minutes apart lasting 30-60 seconds and becoming increasingly more intense     You have an uncontrollable gush of fluid from your vagina or feel a pop and gush like your water has broken    You have HEAVY bleeding, like heavy period, blood running down your legs, or  soaking a pad.     Some bleeding after a pelvic exam, after intercourse, or in labor when your cervix is dilating is normal and is referred to as \"bloody show\"    You have severe, continuous back or abdominal pain    You feel it is time to go to the hospital    If this is your first labor, call when contractions are very intense and have been about every 3-4 minutes for about an hour    If it is your second labor or more, call when contractions are strong and about every 3-5 minutes or sooner depending on your level of discomfort.     Keep in mind we are always here for you! If you have questions, concerns please don't hesitate to call us.     What to eat/drink in labor: Drink plenty of fluid (water most importantly, juice, soda or tea without caffeine). Eat rice, pasta, soup, cereal, " bread/toast, and fruit. Avoid dairy and greasy food as they are difficult to digest and you may experience some nausea during labor.    Comfort measures:    Baths and showers (ok even with ruptured membranes, it may temporarily slow contractions if you are still in the early stage of labor)    Warm/hot packs for back pain or discomfort    Back, belly, or thigh massages    Standing, rocking, walking, leaning over bed or tables, side-lying and sleeping    Miscellaneous:     Contractions are timed from the beginning of one to the beginning of the next    Try hard to sleep during the early stage of labor when you are not that uncomfortable. Timing of contractions at this point is not important    Even if you cannot sleep, resting in bed or on the couch can help you maintain your energy for labor    When you arrive at the hospital the nurse will check your baby's heartbeat, check your cervix, and will call us. The midwife on call will come in and be with you when you are in active labor    After hours you need to enter the hospital through the emergency room    Fetal Kick Counts    It is important to know when your baby's movements occur. We often get busy with work and life and do not pay close attention to their movements.        Women typically begin feeling movement between 18-22 weeks of gestation, sometimes it can be earlier or later depending on where your placenta is       Movements usually begin feeling like popping or fluttering and as the baby grows they become more pronounced    Toward the end of pregnancy as the baby gets larger they may not move as much or make as big of movements. Babies have maturing sleep cycles as well as not as much room to move and flip. If you are ever concerned about your baby's movements or have not felt the baby move for a while, we recommend you do a fetal kick count. Prior to starting your count drink a glass of water or juice and eat a snack. Then lay down on your side and  begin to count movements.     How to do a Fetal Kick Counts    There are many different ways to monitor your baby's movements. Movements can range from large jabs to small kicks, or wiggles.  Hiccups count!      Count 10 movements in 2 hours when resting and focusing    Count 10 movements in 12 hours when doing normal activity    We recommend that if movements occur but seem decreased that you should be seen in the clinic or hospital for evaluation within 12 hours. If fetal movement is absent or fetal kick counts are low please contact us right away.    If you ever have any concerns about your baby's movements DO NOT HESITATE to call us, we are here for you!    [facility]  [phone]    NATURAL LABOR PREPARATION    So, you're getting closer and closer to your due date and wondering what you can do to help get your body ready for labor.   Here are some natural methods you can try:    NOTE: DO NOT begin any of the following prior to 36 completed weeks of pregnancy!    Evening Primrose Oil: Take 1000mg by mouth three times per day. This encourages the cervix to ripen. Cervical ripening is when your cervix becomes more soft and stretchy to get ready for dilation and effacement.     Red Raspberry Leaf Tea: Red raspberry tea (get it at a Co-op or in the grocery store). Drink three cups per day (hot or cold). This can help to prepare the uterine muscles for labor.    PREECLAMPSIA SIGNS AND SYMPTOMS    Preeclampsia is a dangerous condition that some women develop in the second half of pregnancy. It can also begin after the baby is born.  Preeclampsia causes high blood pressure and can cause problems with many organ systems in your body.  It can also affect the growth of your baby. The exact cause of preeclampsia is unknown, however, there are signs and symptoms to watch for:    -A bad headache that doesn't improve with Tylenol  -Visual changes such as spots, flashes of light, blurry vision  -Pain in the  "upper right part of your abdomen, especially under the ribs that doesn't go away  -Nausea and/or vomiting  -Feeling extremely tired  -Yellowing of the skin and/or eyes  -Feeling \"not quite right\" or that something is wrong  -An extreme amount of swelling (some swelling in pregnancy is very normal)    If your midwife feels that you are developing preeclampsia, you will have lab tests drawn and will be monitored very closely.     If you are experiencing anyof these symptoms, call the [facility] immediately at [phone].          Follow-ups after your visit        Follow-up notes from your care team     Return in about 1 week (around 7/31/2018) for Prenatal Visit.      Your next 10 appointments already scheduled     Jul 31, 2018  9:00 AM CDT   ESTABLISHED PRENATAL with SINCERE Rodas CNM   [facility] ([facility])    [address] [place] [address] [id number] [phone]              Who to contact     If you have questions or need follow up information about today's clinic visit or your schedule please contact [facility] directly at [phone].  Normal or non-critical lab and imaging results will be communicated to you by MyChart, letter or phone within 4 business days after the clinic has received the results. If you do not hear from us within 7 days, please contact the clinic through MyChart or phone. If you have a critical or abnormal lab result, we will notify you by phone as soon as possible.  Submit refill requests through [employer] or call your pharmacy and they will forward the refill request to us. Please allow 3 business days for your refill to be completed.          Additional Information About Your Visit        [employer] Information     [employer] gives you secure access to your electronic health record. If you see a primary care provider, you can also send messages to your care team and make " appointments. If you have questions, please call your primary care clinic.  If you do not have a primary care provider, please call [phone] and they will assist you.        Care EveryWhere ID     This is your Care EveryWhere ID. This could be used by other organizations to access your [place] medical records  [id number]        Your Vitals Were     Last Period BMI (Body Mass Index)                10/11/2017 42.77 kg/m2           Blood Pressure from Last 3 Encounters:   07/24/18 122/80   07/17/18 130/66   07/10/18 116/74    Weight from Last 3 Encounters:   07/24/18 265 lb (120.2 kg)   07/17/18 263 lb 12.8 oz (119.7 kg)   07/10/18 265 lb (120.2 kg)              Today, you had the following     No orders found for display       Primary Care Provider Office Phone # Fax #    [facility] [phone] [address]  [facility] [address]        Equal Access to Services     KYLE ROCHA : Hadii aad ku hadasho Soomaali, waaxda luqadaha, qaybta kaalmada adeegyada, waxay idiin haydavid sullivan . So [facility] [phone].    ATENCIÓN: Si habla español, tiene a mcrae disposición servicios gratuitos de asistencia lingüística. Llame al [phone].    We comply with applicable federal civil rights laws and [place] laws. We do not discriminate on the basis of race, color, national origin, age, disability, sex, sexual orientation, or gender identity.            Thank you!     Thank you for choosing [facility]  for your care. Our goal is always to provide you with excellent care. Hearing back from our patients is one way we can continue to improve our services. Please take a few minutes to complete the written survey that you may receive in the mail after your visit with us. Thank you!             Your Updated Medication List - Protect others around you: Learn how to safely use, store and throw away your medicines at www.disposemymeds.org.          This list  is accurate as of 7/24/18  9:27 AM.  Always use your most recent med list.                   Brand Name Dispense Instructions for use Diagnosis    levothyroxine 50 MCG tablet    SYNTHROID    90 tablet    Take 1 tablet (50 mcg) by mouth daily    Supervision of high risk pregnancy in first trimester       prenatal multivitamin plus iron 27-0.8 MG Tabs per tablet      Take 1 tablet by mouth daily    Supervision of high risk pregnancy in first trimester       VITAMIN D-3 PO      Take 4,000 Units by mouth

## 2018-07-24 NOTE — PROGRESS NOTES
Feels tired and ready. Loose stool and ctxs resolved from last week  Fetal movement: positive  Denies vaginal bleeding/lof, some contractions  Warning signs reviewed   Labor signs and symptoms discussed  Denies s/sx of preeclampsia and aware of what to report    Return to clinic 1 week      SINCERE BourgeoisM

## 2018-07-31 ENCOUNTER — PRENATAL OFFICE VISIT (OUTPATIENT)
Dept: MIDWIFE SERVICES | Facility: CLINIC | Age: 33
End: 2018-07-31
Payer: COMMERCIAL

## 2018-07-31 VITALS — DIASTOLIC BLOOD PRESSURE: 80 MMHG | WEIGHT: 266 LBS | SYSTOLIC BLOOD PRESSURE: 128 MMHG | BODY MASS INDEX: 42.93 KG/M2

## 2018-07-31 DIAGNOSIS — Z34.83 ENCOUNTER FOR SUPERVISION OF OTHER NORMAL PREGNANCY IN THIRD TRIMESTER: Primary | ICD-10-CM

## 2018-07-31 DIAGNOSIS — O99.213 OBESITY AFFECTING PREGNANCY IN THIRD TRIMESTER: ICD-10-CM

## 2018-07-31 PROCEDURE — 99207 ZZC PRENATAL VISIT: CPT | Performed by: ADVANCED PRACTICE MIDWIFE

## 2018-07-31 NOTE — PATIENT INSTRUCTIONS
"PREECLAMPSIA SIGNS AND SYMPTOMS    Preeclampsia is a dangerous condition that some women develop in the second half of pregnancy. It can also begin after the baby is born.  Preeclampsia causes high blood pressure and can cause problems with many organ systems in your body.  It can also affect the growth of your baby. The exact cause of preeclampsia is unknown, however, there are signs and symptoms to watch for:    -A bad headache that doesn't improve with Tylenol  -Visual changes such as spots, flashes of light, blurry vision  -Pain in the upper right part of your abdomen, especially under the ribs that doesn't go away  -Nausea and/or vomiting  -Feeling extremely tired  -Yellowing of the skin and/or eyes  -Feeling \"not quite right\" or that something is wrong  -An extreme amount of swelling (some swelling in pregnancy is very normal)    If your midwife feels that you are developing preeclampsia, you will have lab tests drawn and will be monitored very closely.     If you are experiencing anyof these symptoms, call the [facility] immediately at [phone].       Post Dates Management    If you've gone beyond your due date you are probably thinking when is this baby coming!  Most babies are born on or after their due date so it is nothing to worry about.    If you are pregnant at 41 weeks we will start some increased monitoring to make sure that your baby and the placenta are healthy enough to continue your pregnancy.      We would have you come to the clinic every 3-4 days to be assessed with an ultrasound called a Biophysical profile (BPP).       This tells us how your baby is doing. We monitor fetal movement, breathing patterns, amniotic fluid level, and heart rate.     Research has shown that by 42 weeks gestation the placenta is not always healthy enough to support the pregnancy further and it is better for the baby to be born.  If you are still pregnant by 41 and a half weeks we will discuss " induction of labor with you and the different options available.     Fetal Kick Counts    It is important to know when your baby's movements occur. We often get busy with work and life and do not pay close attention to their movements.        Women typically begin feeling movement between 18-22 weeks of gestation, sometimes it can be earlier or later depending on where your placenta is       Movements usually begin feeling like popping or fluttering and as the baby grows they become more pronounced    Toward the end of pregnancy as the baby gets larger they may not move as much or make as big of movements. Babies have maturing sleep cycles as well as not as much room to move and flip. If you are ever concerned about your baby's movements or have not felt the baby move for a while, we recommend you do a fetal kick count. Prior to starting your count drink a glass of water or juice and eat a snack. Then lay down on your side and begin to count movements.     How to do a Fetal Kick Counts    There are many different ways to monitor your baby's movements. Movements can range from large jabs to small kicks, or wiggles.  Hiccups count!      Count 10 movements in 2 hours when resting and focusing    Count 10 movements in 12 hours when doing normal activity    We recommend that if movements occur but seem decreased that you should be seen in the clinic or hospital for evaluation within 12 hours. If fetal movement is absent or fetal kick counts are low please contact us right away.    If you ever have any concerns about your baby's movements DO NOT HESITATE to call us, we are here for you!    [facility]  [phone]

## 2018-07-31 NOTE — PROGRESS NOTES
"Feels ready to be done. Tired   Fetal movement: positive  Denies vaginal bleeding/lof, a lot of talib orellana contractions, a few \"big contractions\"   Warning signs reviewed   Labor signs and symptoms discussed  Denies s/sx of pre-eclampsia and aware of what to report  Discussed post dates management, order for BPP at 41 weeks, discussed recommendation for induction of labor after 41 weeks. Would like to wait until 42 weeks for IOL, hoping for spontaneous labor to begin.   Briefly discussed using Pitocin if needing an IOL.  Requested membrane sweep. R/B discussed  SVE 1.5/50/-3 before membrane sweep.   Cervix 3/60/-3 after membrane sweep.    Return to clinic Friday 8/3/2018 for BPP and CNM visit    Tabatha POST CNM              "

## 2018-07-31 NOTE — MR AVS SNAPSHOT
"              After Visit Summary   7/31/2018    Elizabeth Crooks    MRN: [id number]           Patient Information     Date Of Birth          1985        Visit Information        Provider Department      7/31/2018 9:00 AM Tabatha Ortega APRN CNM [facility]        Today's Diagnoses     Encounter for supervision of other normal pregnancy in third trimester    -  1    Obesity affecting pregnancy in third trimester          Care Instructions    PREECLAMPSIA SIGNS AND SYMPTOMS    Preeclampsia is a dangerous condition that some women develop in the second half of pregnancy. It can also begin after the baby is born.  Preeclampsia causes high blood pressure and can cause problems with many organ systems in your body.  It can also affect the growth of your baby. The exact cause of preeclampsia is unknown, however, there are signs and symptoms to watch for:    -A bad headache that doesn't improve with Tylenol  -Visual changes such as spots, flashes of light, blurry vision  -Pain in the upper right part of your abdomen, especially under the ribs that doesn't go away  -Nausea and/or vomiting  -Feeling extremely tired  -Yellowing of the skin and/or eyes  -Feeling \"not quite right\" or that something is wrong  -An extreme amount of swelling (some swelling in pregnancy is very normal)    If your midwife feels that you are developing preeclampsia, you will have lab tests drawn and will be monitored very closely.     If you are experiencing anyof these symptoms, call the [facility] immediately at [phone].       Post Dates Management    If you've gone beyond your due date you are probably thinking when is this baby coming!  Most babies are born on or after their due date so it is nothing to worry about.    If you are pregnant at 41 weeks we will start some increased monitoring to make sure that your baby and the placenta are healthy enough to continue your pregnancy.      We " would have you come to the clinic every 3-4 days to be assessed with an ultrasound called a Biophysical profile (BPP).       This tells us how your baby is doing. We monitor fetal movement, breathing patterns, amniotic fluid level, and heart rate.     Research has shown that by 42 weeks gestation the placenta is not always healthy enough to support the pregnancy further and it is better for the baby to be born.  If you are still pregnant by 41 and a half weeks we will discuss induction of labor with you and the different options available.     Fetal Kick Counts    It is important to know when your baby's movements occur. We often get busy with work and life and do not pay close attention to their movements.        Women typically begin feeling movement between 18-22 weeks of gestation, sometimes it can be earlier or later depending on where your placenta is       Movements usually begin feeling like popping or fluttering and as the baby grows they become more pronounced    Toward the end of pregnancy as the baby gets larger they may not move as much or make as big of movements. Babies have maturing sleep cycles as well as not as much room to move and flip. If you are ever concerned about your baby's movements or have not felt the baby move for a while, we recommend you do a fetal kick count. Prior to starting your count drink a glass of water or juice and eat a snack. Then lay down on your side and begin to count movements.     How to do a Fetal Kick Counts    There are many different ways to monitor your baby's movements. Movements can range from large jabs to small kicks, or wiggles.  Hiccups count!      Count 10 movements in 2 hours when resting and focusing    Count 10 movements in 12 hours when doing normal activity    We recommend that if movements occur but seem decreased that you should be seen in the clinic or hospital for evaluation within 12 hours. If fetal movement is absent or fetal kick counts are low  please contact us right away.    If you ever have any concerns about your baby's movements DO NOT HESITATE to call us, we are here for you!    [facility]  [phone]              Follow-ups after your visit        Follow-up notes from your care team     Return in about 3 days (around 8/3/2018) for Prenatal Visit.      Future tests that were ordered for you today     Open Future Orders        Priority Expected Expires Ordered    US OB Fetal Biophys Prf wo NonStrs Singls Sgl Routine 8/3/2018 8/1/2019 7/31/2018            Who to contact     If you have questions or need follow up information about today's clinic visit or your schedule please contact [facility] directly at [phone].  Normal or non-critical lab and imaging results will be communicated to you by [employer], letter or phone within 4 business days after the clinic has received the results. If you do not hear from us within 7 days, please contact the clinic through [employer] or phone. If you have a critical or abnormal lab result, we will notify you by phone as soon as possible.  Submit refill requests through [employer] or call your pharmacy and they will forward the refill request to us. Please allow 3 business days for your refill to be completed.          Additional Information About Your Visit        [email or web address] Information     [employer] gives you secure access to your electronic health record. If you see a primary care provider, you can also send messages to your care team and make appointments. If you have questions, please call your primary care clinic.  If you do not have a primary care provider, please call [phone] and they will assist you.        Care EveryWhere ID     This is your Care EveryWhere ID. This could be used by other organizations to access your [place] medical records  [id number]        Your Vitals Were     Last Period BMI (Body Mass Index)                10/11/2017 42.93 kg/m2           Blood  Pressure from Last 3 Encounters:   07/31/18 128/80   07/24/18 122/80   07/17/18 130/66    Weight from Last 3 Encounters:   07/31/18 266 lb (120.7 kg)   07/24/18 265 lb (120.2 kg)   07/17/18 263 lb 12.8 oz (119.7 kg)               Primary Care Provider Office Phone # Fax #    [facility] [phone] [address]  [facility] [id number]        Equal Access to Services     KYLE ROCHA : Hadii aad ku hadasho Soomaali, waaxda luqadaha, qaybta kaalmada adeegyada, yadira sullivan . So [facility] [phone].    ATENCIÓN: Si habla español, tiene a mcrae disposición servicios gratuitos de asistencia lingüística. [facility] [phone].    We comply with applicable federal civil rights laws and [place] laws. We do not discriminate on the basis of race, color, national origin, age, disability, sex, sexual orientation, or gender identity.            Thank you!     Thank you for choosing [facility]  for your care. Our goal is always to provide you with excellent care. Hearing back from our patients is one way we can continue to improve our services. Please take a few minutes to complete the written survey that you may receive in the mail after your visit with us. Thank you!             Your Updated Medication List - Protect others around you: Learn how to safely use, store and throw away your medicines at www.disposemymeds.org.          This list is accurate as of 7/31/18  9:31 AM.  Always use your most recent med list.                   Brand Name Dispense Instructions for use Diagnosis    levothyroxine 50 MCG tablet    SYNTHROID    90 tablet    Take 1 tablet (50 mcg) by mouth daily    Supervision of high risk pregnancy in first trimester       prenatal multivitamin plus iron 27-0.8 MG Tabs per tablet      Take 1 tablet by mouth daily    Supervision of high risk pregnancy in first trimester       VITAMIN D-3 PO      Take 4,000 Units by mouth

## 2018-08-03 ENCOUNTER — PRENATAL OFFICE VISIT (OUTPATIENT)
Dept: MIDWIFE SERVICES | Facility: CLINIC | Age: 33
End: 2018-08-03
Attending: ADVANCED PRACTICE MIDWIFE
Payer: COMMERCIAL

## 2018-08-03 ENCOUNTER — RADIANT APPOINTMENT (OUTPATIENT)
Dept: ULTRASOUND IMAGING | Facility: CLINIC | Age: 33
End: 2018-08-03
Attending: ADVANCED PRACTICE MIDWIFE
Payer: COMMERCIAL

## 2018-08-03 VITALS
SYSTOLIC BLOOD PRESSURE: 128 MMHG | DIASTOLIC BLOOD PRESSURE: 80 MMHG | HEIGHT: 66 IN | WEIGHT: 266.8 LBS | BODY MASS INDEX: 42.88 KG/M2

## 2018-08-03 DIAGNOSIS — Z34.83 ENCOUNTER FOR SUPERVISION OF OTHER NORMAL PREGNANCY IN THIRD TRIMESTER: ICD-10-CM

## 2018-08-03 DIAGNOSIS — O99.213 OBESITY AFFECTING PREGNANCY IN THIRD TRIMESTER: ICD-10-CM

## 2018-08-03 PROCEDURE — 76819 FETAL BIOPHYS PROFIL W/O NST: CPT | Performed by: OBSTETRICS & GYNECOLOGY

## 2018-08-03 PROCEDURE — 99207 ZZC PRENATAL VISIT: CPT | Performed by: ADVANCED PRACTICE MIDWIFE

## 2018-08-03 NOTE — MR AVS SNAPSHOT
"              After Visit Summary   8/3/2018    Elizabeth Crooks    MRN: [id number]           Patient Information     Date Of Birth          1985        Visit Information        Provider Department      8/3/2018 9:40 Tabatha Mendoza APRN CNM [facility]        Today's Diagnoses     Obesity affecting pregnancy in third trimester          Care Instructions    PREECLAMPSIA SIGNS AND SYMPTOMS    Preeclampsia is a dangerous condition that some women develop in the second half of pregnancy. It can also begin after the baby is born.  Preeclampsia causes high blood pressure and can cause problems with many organ systems in your body.  It can also affect the growth of your baby. The exact cause of preeclampsia is unknown, however, there are signs and symptoms to watch for:    -A bad headache that doesn't improve with Tylenol  -Visual changes such as spots, flashes of light, blurry vision  -Pain in the upper right part of your abdomen, especially under the ribs that doesn't go away  -Nausea and/or vomiting  -Feeling extremely tired  -Yellowing of the skin and/or eyes  -Feeling \"not quite right\" or that something is wrong  -An extreme amount of swelling (some swelling in pregnancy is very normal)    If your midwife feels that you are developing preeclampsia, you will have lab tests drawn and will be monitored very closely.     If you are experiencing anyof these symptoms, call the [facility] immediately at [phone].  Labor Instructions for Midwife Patients    When to call:  Both during and after office hours call  [phone]. There is a triage RN to take your calls and answer your questions 24 hours a day.  If she cannot answer your question she will page the midwife on call for you.    When to call:  Call anytime you have important concerns about you or your baby.     Call if:    You are having contractions at regular intervals about 5-6 minutes apart lasting 30-60 " "seconds and becoming increasingly more intense     You have an uncontrollable gush of fluid from your vagina or feel a pop and gush like your water has broken    You have HEAVY bleeding, like heavy period, blood running down your legs, or  soaking a pad.     Some bleeding after a pelvic exam, after intercourse, or in labor when your cervix is dilating is normal and is referred to as \"bloody show\"    You have severe, continuous back or abdominal pain    You feel it is time to go to the hospital    If this is your first labor, call when contractions are very intense and have been about every 3-4 minutes for about an hour    If it is your second labor or more, call when contractions are strong and about every 3-5 minutes or sooner depending on your level of discomfort.     Keep in mind we are always here for you! If you have questions, concerns please don't hesitate to call us.     What to eat/drink in labor: Drink plenty of fluid (water most importantly, juice, soda or tea without caffeine). Eat rice, pasta, soup, cereal, bread/toast, and fruit. Avoid dairy and greasy food as they are difficult to digest and you may experience some nausea during labor.    Comfort measures:    Baths and showers (ok even with ruptured membranes, it may temporarily slow contractions if you are still in the early stage of labor)    Warm/hot packs for back pain or discomfort    Back, belly, or thigh massages    Standing, rocking, walking, leaning over bed or tables, side-lying and sleeping    Miscellaneous:     Contractions are timed from the beginning of one to the beginning of the next    Try hard to sleep during the early stage of labor when you are not that uncomfortable. Timing of contractions at this point is not important    Even if you cannot sleep, resting in bed or on the couch can help you maintain your energy for labor    When you arrive at the hospital the nurse will check your baby's heartbeat, check your cervix, and will " call us. The midwife on call will come in and be with you when you are in active labor    After hours you need to enter the hospital through the emergency room    NATURAL LABOR PREPARATION    So, you're getting closer and closer to your due date and wondering what you can do to help get your body ready for labor.   Here are some natural methods you can try:    NOTE: DO NOT begin any of the following prior to 36 completed weeks of pregnancy!    Evening Primrose Oil: Take 1000mg by mouth three times per day. This encourages the cervix to ripen. Cervical ripening is when your cervix becomes more soft and stretchy to get ready for dilation and effacement.     Red Raspberry Leaf Tea: Red raspberry tea (get it at a Co-op or in the grocery store). Drink three cups per day (hot or cold). This can help to prepare the uterine muscles for labor.    Fetal Kick Counts    It is important to know when your baby's movements occur. We often get busy with work and life and do not pay close attention to their movements.        Women typically begin feeling movement between 18-22 weeks of gestation, sometimes it can be earlier or later depending on where your placenta is       Movements usually begin feeling like popping or fluttering and as the baby grows they become more pronounced    Toward the end of pregnancy as the baby gets larger they may not move as much or make as big of movements. Babies have maturing sleep cycles as well as not as much room to move and flip. If you are ever concerned about your baby's movements or have not felt the baby move for a while, we recommend you do a fetal kick count. Prior to starting your count drink a glass of water or juice and eat a snack. Then lay down on your side and begin to count movements.     How to do a Fetal Kick Counts    There are many different ways to monitor your baby's movements. Movements can range from large jabs to small kicks, or wiggles.  Hiccups count!      Count 10  movements in 2 hours when resting and focusing    Count 10 movements in 12 hours when doing normal activity    We recommend that if movements occur but seem decreased that you should be seen in the clinic or hospital for evaluation within 12 hours. If fetal movement is absent or fetal kick counts are low please contact us right away.    If you ever have any concerns about your baby's movements DO NOT HESITATE to call us, we are here for you!    [facility]  [phone]              Follow-ups after your visit        Follow-up notes from your care team     Return in about 3 days (around 8/6/2018) for Prenatal Visit.      Your next 10 appointments already scheduled     Aug 06, 2018 10:00 AM CDT   US FETAL BIOPHYS PROFILE W/O NON STRESS TEST with WEUS1   [facility]    [address] [place] [id number] [phone]           Please bring a list of your medicines (including vitamins, minerals and over-the-counter drugs). Also, tell your doctor about any allergies you may have. Wear comfortable clothes and leave your valuables at home.  Drink four 8-ounce glasses of fluid an hour before your exam. If you need to empty your bladder before your exam, try to release only a little urine. Then, drink another glass of fluid.  You may have up to two family members in the exam room. If you bring a small child, an adult must be there to care for him or her. No video or camera photography during the procedure.  Please call the Imaging Department at your exam site with any questions.            Aug 06, 2018 10:40 AM CDT   ESTABLISHED PRENATAL with SINCERE Adames CNM   [facility]    [address] [id number] [phone]              Future tests that were ordered for you today     Open Future Orders        Priority Expected Expires Ordered  "   US OB Fetal Biophys Prf wo NonStrs Singls Sgl Routine 8/6/2018 8/4/2019 8/3/2018            Who to contact     If you have questions or need follow up information about today's clinic visit or your schedule please contact [facility] [place] directly at [phone].  Normal or non-critical lab and imaging results will be communicated to you by MyChart, letter or phone within 4 business days after the clinic has received the results. If you do not hear from us within 7 days, please contact the clinic through MyChart or phone. If you have a critical or abnormal lab result, we will notify you by phone as soon as possible.  Submit refill requests through [employer] or call your pharmacy and they will forward the refill request to us. Please allow 3 business days for your refill to be completed.          Additional Information About Your Visit        MyChart Information     [employer] gives you secure access to your electronic health record. If you see a primary care provider, you can also send messages to your care team and make appointments. If you have questions, please call your primary care clinic.  If you do not have a primary care provider, please call [phone] and they will assist you.        Care EveryWhere ID     This is your Care EveryWhere ID. This could be used by other organizations to access your [facility] medical records  [id number]        Your Vitals Were     Height Last Period BMI (Body Mass Index)             5' 6\" (1.676 m) 10/11/2017 43.06 kg/m2          Blood Pressure from Last 3 Encounters:   08/03/18 128/80   07/31/18 128/80   07/24/18 122/80    Weight from Last 3 Encounters:   08/03/18 266 lb 12.8 oz (121 kg)   07/31/18 266 lb (120.7 kg)   07/24/18 265 lb (120.2 kg)               Primary Care Provider Office Phone # Fax #    [facility] [phone] [address]  [facility] [address]        Equal Access to Services     KYLE ROCHA AH: Hadii " alejandrina Chau, donna grayson, qavladta kadenise kayinderjit, yadira fordnicolasaamaury sullivan marlo. So [facility] [phone].    ATENCIÓN: Si habla español, tiene a mcrae disposición servicios gratuitos de asistencia lingüística. Llame al [phone].    We comply with applicable federal civil rights laws and [place] laws. We do not discriminate on the basis of race, color, national origin, age, disability, sex, sexual orientation, or gender identity.            Thank you!     Thank you for choosing [facility]  for your care. Our goal is always to provide you with excellent care. Hearing back from our patients is one way we can continue to improve our services. Please take a few minutes to complete the written survey that you may receive in the mail after your visit with us. Thank you!             Your Updated Medication List - Protect others around you: Learn how to safely use, store and throw away your medicines at www.disposemymeds.org.          This list is accurate as of 8/3/18 10:20 AM.  Always use your most recent med list.                   Brand Name Dispense Instructions for use Diagnosis    levothyroxine 50 MCG tablet    SYNTHROID    90 tablet    Take 1 tablet (50 mcg) by mouth daily    Supervision of high risk pregnancy in first trimester       prenatal multivitamin plus iron 27-0.8 MG Tabs per tablet      Take 1 tablet by mouth daily    Supervision of high risk pregnancy in first trimester       VITAMIN D-3 PO      Take 4,000 Units by mouth

## 2018-08-03 NOTE — PATIENT INSTRUCTIONS
"PREECLAMPSIA SIGNS AND SYMPTOMS    Preeclampsia is a dangerous condition that some women develop in the second half of pregnancy. It can also begin after the baby is born.  Preeclampsia causes high blood pressure and can cause problems with many organ systems in your body.  It can also affect the growth of your baby. The exact cause of preeclampsia is unknown, however, there are signs and symptoms to watch for:    -A bad headache that doesn't improve with Tylenol  -Visual changes such as spots, flashes of light, blurry vision  -Pain in the upper right part of your abdomen, especially under the ribs that doesn't go away  -Nausea and/or vomiting  -Feeling extremely tired  -Yellowing of the skin and/or eyes  -Feeling \"not quite right\" or that something is wrong  -An extreme amount of swelling (some swelling in pregnancy is very normal)    If your midwife feels that you are developing preeclampsia, you will have lab tests drawn and will be monitored very closely.     If you are experiencing anyof these symptoms, call the [facility] immediately at [phone].  Labor Instructions for Midwife Patients    When to call:  Both during and after office hours call  [phone]. There is a triage RN to take your calls and answer your questions 24 hours a day.  If she cannot answer your question she will page the midwife on call for you.    When to call:  Call anytime you have important concerns about you or your baby.     Call if:    You are having contractions at regular intervals about 5-6 minutes apart lasting 30-60 seconds and becoming increasingly more intense     You have an uncontrollable gush of fluid from your vagina or feel a pop and gush like your water has broken    You have HEAVY bleeding, like heavy period, blood running down your legs, or  soaking a pad.     Some bleeding after a pelvic exam, after intercourse, or in labor when your cervix is dilating is normal and is referred to as \"bloody " "show\"    You have severe, continuous back or abdominal pain    You feel it is time to go to the hospital    If this is your first labor, call when contractions are very intense and have been about every 3-4 minutes for about an hour    If it is your second labor or more, call when contractions are strong and about every 3-5 minutes or sooner depending on your level of discomfort.     Keep in mind we are always here for you! If you have questions, concerns please don't hesitate to call us.     What to eat/drink in labor: Drink plenty of fluid (water most importantly, juice, soda or tea without caffeine). Eat rice, pasta, soup, cereal, bread/toast, and fruit. Avoid dairy and greasy food as they are difficult to digest and you may experience some nausea during labor.    Comfort measures:    Baths and showers (ok even with ruptured membranes, it may temporarily slow contractions if you are still in the early stage of labor)    Warm/hot packs for back pain or discomfort    Back, belly, or thigh massages    Standing, rocking, walking, leaning over bed or tables, side-lying and sleeping    Miscellaneous:     Contractions are timed from the beginning of one to the beginning of the next    Try hard to sleep during the early stage of labor when you are not that uncomfortable. Timing of contractions at this point is not important    Even if you cannot sleep, resting in bed or on the couch can help you maintain your energy for labor    When you arrive at the hospital the nurse will check your baby's heartbeat, check your cervix, and will call us. The midwife on call will come in and be with you when you are in active labor    After hours you need to enter the hospital through the emergency room    NATURAL LABOR PREPARATION    So, you're getting closer and closer to your due date and wondering what you can do to help get your body ready for labor.   Here are some natural methods you can try:    NOTE: DO NOT begin any of the " following prior to 36 completed weeks of pregnancy!    Evening Primrose Oil: Take 1000mg by mouth three times per day. This encourages the cervix to ripen. Cervical ripening is when your cervix becomes more soft and stretchy to get ready for dilation and effacement.     Red Raspberry Leaf Tea: Red raspberry tea (get it at a Co-op or in the grocery store). Drink three cups per day (hot or cold). This can help to prepare the uterine muscles for labor.    Fetal Kick Counts    It is important to know when your baby's movements occur. We often get busy with work and life and do not pay close attention to their movements.        Women typically begin feeling movement between 18-22 weeks of gestation, sometimes it can be earlier or later depending on where your placenta is       Movements usually begin feeling like popping or fluttering and as the baby grows they become more pronounced    Toward the end of pregnancy as the baby gets larger they may not move as much or make as big of movements. Babies have maturing sleep cycles as well as not as much room to move and flip. If you are ever concerned about your baby's movements or have not felt the baby move for a while, we recommend you do a fetal kick count. Prior to starting your count drink a glass of water or juice and eat a snack. Then lay down on your side and begin to count movements.     How to do a Fetal Kick Counts    There are many different ways to monitor your baby's movements. Movements can range from large jabs to small kicks, or wiggles.  Hiccups count!      Count 10 movements in 2 hours when resting and focusing    Count 10 movements in 12 hours when doing normal activity    We recommend that if movements occur but seem decreased that you should be seen in the clinic or hospital for evaluation within 12 hours. If fetal movement is absent or fetal kick counts are low please contact us right away.    If you ever have any concerns about your baby's movements DO  NOT HESITATE to call us, we are here for you!    [facility]  [phone]

## 2018-08-03 NOTE — PROGRESS NOTES
Here with  and son today. Has a cold, feeling general pain and achiness, runny stools, hot, flushing feeling.  Fetal movement: positive  BPP:  8/8, ROGERS:  8.93cm  Denies vaginal bleeding/lof, some contractions  SVE 3.5/60/-3 cervix more midposition, not stretchy  Membrane sweep done today.   Warning signs reviewed   Labor signs and symptoms discussed  Denies s/sx of pre-eclampsia and aware of what to report  Discussed post dates management, if still pregnant and BPP is normal on Monday, will scheduled IOL on Friday 8/10/18. Elizabeth would like to wait as long as possible before IOL.   BPP on Monday 8/6/18  Return to clinic 3-4 days    Tabatha POST CNM

## 2018-08-05 ENCOUNTER — NURSE TRIAGE (OUTPATIENT)
Dept: NURSING | Facility: CLINIC | Age: 33
End: 2018-08-05

## 2018-08-05 ENCOUNTER — ANESTHESIA EVENT (OUTPATIENT)
Dept: OBGYN | Facility: CLINIC | Age: 33
End: 2018-08-05
Payer: COMMERCIAL

## 2018-08-05 ENCOUNTER — HOSPITAL ENCOUNTER (INPATIENT)
Facility: CLINIC | Age: 33
LOS: 1 days | Discharge: HOME | End: 2018-08-06
Attending: NURSE PRACTITIONER | Admitting: ADVANCED PRACTICE MIDWIFE
Payer: COMMERCIAL

## 2018-08-05 ENCOUNTER — ANESTHESIA (OUTPATIENT)
Dept: OBGYN | Facility: CLINIC | Age: 33
End: 2018-08-05
Payer: COMMERCIAL

## 2018-08-05 DIAGNOSIS — R52 POSTPARTUM PAIN: ICD-10-CM

## 2018-08-05 LAB
ABO + RH BLD: NORMAL
ABO + RH BLD: NORMAL
BASOPHILS # BLD AUTO: 0 10E9/L (ref 0–0.2)
BASOPHILS NFR BLD AUTO: 0.2 %
DIFFERENTIAL METHOD BLD: ABNORMAL
EOSINOPHIL # BLD AUTO: 0.2 10E9/L (ref 0–0.7)
EOSINOPHIL NFR BLD AUTO: 1.5 %
ERYTHROCYTE [DISTWIDTH] IN BLOOD BY AUTOMATED COUNT: 14.5 % (ref 10–15)
HCT VFR BLD AUTO: 36.6 % (ref 35–47)
HGB BLD-MCNC: 11.8 G/DL (ref 11.7–15.7)
IMM GRANULOCYTES # BLD: 0.1 10E9/L (ref 0–0.4)
IMM GRANULOCYTES NFR BLD: 0.5 %
LYMPHOCYTES # BLD AUTO: 2.7 10E9/L (ref 0.8–5.3)
LYMPHOCYTES NFR BLD AUTO: 19.9 %
MCH RBC QN AUTO: 28.1 PG (ref 26.5–33)
MCHC RBC AUTO-ENTMCNC: 32.2 G/DL (ref 31.5–36.5)
MCV RBC AUTO: 87 FL (ref 78–100)
MONOCYTES # BLD AUTO: 1.2 10E9/L (ref 0–1.3)
MONOCYTES NFR BLD AUTO: 9.2 %
NEUTROPHILS # BLD AUTO: 9.2 10E9/L (ref 1.6–8.3)
NEUTROPHILS NFR BLD AUTO: 68.7 %
NRBC # BLD AUTO: 0 10*3/UL
NRBC BLD AUTO-RTO: 0 /100
PLATELET # BLD AUTO: 264 10E9/L (ref 150–450)
RBC # BLD AUTO: 4.2 10E12/L (ref 3.8–5.2)
SPECIMEN EXP DATE BLD: NORMAL
WBC # BLD AUTO: 13.3 10E9/L (ref 4–11)

## 2018-08-05 PROCEDURE — 00HU33Z INSERTION OF INFUSION DEVICE INTO SPINAL CANAL, PERCUTANEOUS APPROACH: ICD-10-PCS | Performed by: ANESTHESIOLOGY

## 2018-08-05 PROCEDURE — 36415 COLL VENOUS BLD VENIPUNCTURE: CPT | Performed by: NURSE PRACTITIONER

## 2018-08-05 PROCEDURE — 25000132 ZZH RX MED GY IP 250 OP 250 PS 637: Performed by: ADVANCED PRACTICE MIDWIFE

## 2018-08-05 PROCEDURE — 59025 FETAL NON-STRESS TEST: CPT

## 2018-08-05 PROCEDURE — 3E0R3BZ INTRODUCTION OF ANESTHETIC AGENT INTO SPINAL CANAL, PERCUTANEOUS APPROACH: ICD-10-PCS | Performed by: ANESTHESIOLOGY

## 2018-08-05 PROCEDURE — 25000125 ZZHC RX 250: Performed by: ANESTHESIOLOGY

## 2018-08-05 PROCEDURE — 59400 OBSTETRICAL CARE: CPT | Performed by: ADVANCED PRACTICE MIDWIFE

## 2018-08-05 PROCEDURE — 27110038 ZZH RX 271: Performed by: ANESTHESIOLOGY

## 2018-08-05 PROCEDURE — 86780 TREPONEMA PALLIDUM: CPT | Performed by: NURSE PRACTITIONER

## 2018-08-05 PROCEDURE — 12000037 ZZH R&B POSTPARTUM INTERMEDIATE

## 2018-08-05 PROCEDURE — 25000125 ZZHC RX 250: Performed by: NURSE PRACTITIONER

## 2018-08-05 PROCEDURE — 72200001 ZZH LABOR CARE VAGINAL DELIVERY SINGLE

## 2018-08-05 PROCEDURE — 85025 COMPLETE CBC W/AUTO DIFF WBC: CPT | Performed by: NURSE PRACTITIONER

## 2018-08-05 PROCEDURE — G0463 HOSPITAL OUTPT CLINIC VISIT: HCPCS | Mod: 25

## 2018-08-05 PROCEDURE — 86900 BLOOD TYPING SEROLOGIC ABO: CPT | Performed by: NURSE PRACTITIONER

## 2018-08-05 PROCEDURE — 10907ZC DRAINAGE OF AMNIOTIC FLUID, THERAPEUTIC FROM PRODUCTS OF CONCEPTION, VIA NATURAL OR ARTIFICIAL OPENING: ICD-10-PCS | Performed by: ADVANCED PRACTICE MIDWIFE

## 2018-08-05 PROCEDURE — 37000011 ZZH ANESTHESIA WARD SERVICE

## 2018-08-05 PROCEDURE — 25000128 H RX IP 250 OP 636: Performed by: NURSE PRACTITIONER

## 2018-08-05 PROCEDURE — 86901 BLOOD TYPING SEROLOGIC RH(D): CPT | Performed by: NURSE PRACTITIONER

## 2018-08-05 PROCEDURE — 25000128 H RX IP 250 OP 636: Performed by: ANESTHESIOLOGY

## 2018-08-05 RX ORDER — OXYTOCIN 10 [USP'U]/ML
10 INJECTION, SOLUTION INTRAMUSCULAR; INTRAVENOUS
Status: DISCONTINUED | OUTPATIENT
Start: 2018-08-05 | End: 2018-08-05

## 2018-08-05 RX ORDER — OXYTOCIN/0.9 % SODIUM CHLORIDE 30/500 ML
100-340 PLASTIC BAG, INJECTION (ML) INTRAVENOUS CONTINUOUS PRN
Status: COMPLETED | OUTPATIENT
Start: 2018-08-05 | End: 2018-08-05

## 2018-08-05 RX ORDER — LANOLIN 100 %
OINTMENT (GRAM) TOPICAL
Status: DISCONTINUED | OUTPATIENT
Start: 2018-08-05 | End: 2018-08-06 | Stop reason: HOSPADM

## 2018-08-05 RX ORDER — NALOXONE HYDROCHLORIDE 0.4 MG/ML
.1-.4 INJECTION, SOLUTION INTRAMUSCULAR; INTRAVENOUS; SUBCUTANEOUS
Status: DISCONTINUED | OUTPATIENT
Start: 2018-08-05 | End: 2018-08-05

## 2018-08-05 RX ORDER — ACETAMINOPHEN 325 MG/1
650 TABLET ORAL EVERY 4 HOURS PRN
Status: DISCONTINUED | OUTPATIENT
Start: 2018-08-05 | End: 2018-08-05

## 2018-08-05 RX ORDER — ACETAMINOPHEN 325 MG/1
650 TABLET ORAL EVERY 4 HOURS PRN
Status: DISCONTINUED | OUTPATIENT
Start: 2018-08-05 | End: 2018-08-06 | Stop reason: HOSPADM

## 2018-08-05 RX ORDER — ONDANSETRON 2 MG/ML
4 INJECTION INTRAMUSCULAR; INTRAVENOUS EVERY 6 HOURS PRN
Status: DISCONTINUED | OUTPATIENT
Start: 2018-08-05 | End: 2018-08-05

## 2018-08-05 RX ORDER — OXYTOCIN/0.9 % SODIUM CHLORIDE 30/500 ML
340 PLASTIC BAG, INJECTION (ML) INTRAVENOUS CONTINUOUS PRN
Status: DISCONTINUED | OUTPATIENT
Start: 2018-08-05 | End: 2018-08-06 | Stop reason: HOSPADM

## 2018-08-05 RX ORDER — LIDOCAINE HYDROCHLORIDE AND EPINEPHRINE 15; 5 MG/ML; UG/ML
3 INJECTION, SOLUTION EPIDURAL
Status: DISCONTINUED | OUTPATIENT
Start: 2018-08-05 | End: 2018-08-05

## 2018-08-05 RX ORDER — MISOPROSTOL 200 UG/1
400 TABLET ORAL
Status: DISCONTINUED | OUTPATIENT
Start: 2018-08-05 | End: 2018-08-06 | Stop reason: HOSPADM

## 2018-08-05 RX ORDER — BISACODYL 10 MG
10 SUPPOSITORY, RECTAL RECTAL DAILY PRN
Status: DISCONTINUED | OUTPATIENT
Start: 2018-08-07 | End: 2018-08-06 | Stop reason: HOSPADM

## 2018-08-05 RX ORDER — PENICILLIN G POTASSIUM 5000000 [IU]/1
5 INJECTION, POWDER, FOR SOLUTION INTRAMUSCULAR; INTRAVENOUS ONCE
Status: COMPLETED | OUTPATIENT
Start: 2018-08-05 | End: 2018-08-05

## 2018-08-05 RX ORDER — OXYTOCIN 10 [USP'U]/ML
10 INJECTION, SOLUTION INTRAMUSCULAR; INTRAVENOUS
Status: DISCONTINUED | OUTPATIENT
Start: 2018-08-05 | End: 2018-08-06 | Stop reason: HOSPADM

## 2018-08-05 RX ORDER — FENTANYL CITRATE 50 UG/ML
50-100 INJECTION, SOLUTION INTRAMUSCULAR; INTRAVENOUS
Status: DISCONTINUED | OUTPATIENT
Start: 2018-08-05 | End: 2018-08-05

## 2018-08-05 RX ORDER — IBUPROFEN 400 MG/1
800 TABLET, FILM COATED ORAL
Status: DISCONTINUED | OUTPATIENT
Start: 2018-08-05 | End: 2018-08-05

## 2018-08-05 RX ORDER — NALOXONE HYDROCHLORIDE 0.4 MG/ML
.1-.4 INJECTION, SOLUTION INTRAMUSCULAR; INTRAVENOUS; SUBCUTANEOUS
Status: DISCONTINUED | OUTPATIENT
Start: 2018-08-05 | End: 2018-08-06 | Stop reason: HOSPADM

## 2018-08-05 RX ORDER — EPHEDRINE SULFATE 50 MG/ML
5 INJECTION, SOLUTION INTRAMUSCULAR; INTRAVENOUS; SUBCUTANEOUS
Status: DISCONTINUED | OUTPATIENT
Start: 2018-08-05 | End: 2018-08-05

## 2018-08-05 RX ORDER — FENTANYL CITRATE 50 UG/ML
100 INJECTION, SOLUTION INTRAMUSCULAR; INTRAVENOUS ONCE
Status: COMPLETED | OUTPATIENT
Start: 2018-08-05 | End: 2018-08-05

## 2018-08-05 RX ORDER — DOCUSATE SODIUM 100 MG/1
100 CAPSULE, LIQUID FILLED ORAL 2 TIMES DAILY
Status: DISCONTINUED | OUTPATIENT
Start: 2018-08-05 | End: 2018-08-06 | Stop reason: HOSPADM

## 2018-08-05 RX ORDER — SODIUM CHLORIDE, SODIUM LACTATE, POTASSIUM CHLORIDE, CALCIUM CHLORIDE 600; 310; 30; 20 MG/100ML; MG/100ML; MG/100ML; MG/100ML
INJECTION, SOLUTION INTRAVENOUS CONTINUOUS
Status: DISCONTINUED | OUTPATIENT
Start: 2018-08-05 | End: 2018-08-05

## 2018-08-05 RX ORDER — HYDROCORTISONE 2.5 %
CREAM (GRAM) TOPICAL 3 TIMES DAILY PRN
Status: DISCONTINUED | OUTPATIENT
Start: 2018-08-05 | End: 2018-08-06 | Stop reason: HOSPADM

## 2018-08-05 RX ORDER — NALBUPHINE HYDROCHLORIDE 10 MG/ML
2.5-5 INJECTION, SOLUTION INTRAMUSCULAR; INTRAVENOUS; SUBCUTANEOUS EVERY 6 HOURS PRN
Status: DISCONTINUED | OUTPATIENT
Start: 2018-08-05 | End: 2018-08-05

## 2018-08-05 RX ORDER — IBUPROFEN 400 MG/1
800 TABLET, FILM COATED ORAL EVERY 6 HOURS PRN
Status: DISCONTINUED | OUTPATIENT
Start: 2018-08-05 | End: 2018-08-06 | Stop reason: HOSPADM

## 2018-08-05 RX ORDER — METHYLERGONOVINE MALEATE 0.2 MG/ML
200 INJECTION INTRAVENOUS
Status: DISCONTINUED | OUTPATIENT
Start: 2018-08-05 | End: 2018-08-05

## 2018-08-05 RX ORDER — OXYCODONE AND ACETAMINOPHEN 5; 325 MG/1; MG/1
1 TABLET ORAL
Status: DISCONTINUED | OUTPATIENT
Start: 2018-08-05 | End: 2018-08-05

## 2018-08-05 RX ORDER — LIDOCAINE 40 MG/G
CREAM TOPICAL
Status: DISCONTINUED | OUTPATIENT
Start: 2018-08-05 | End: 2018-08-05

## 2018-08-05 RX ORDER — ROPIVACAINE HYDROCHLORIDE 2 MG/ML
10 INJECTION, SOLUTION EPIDURAL; INFILTRATION; PERINEURAL ONCE
Status: COMPLETED | OUTPATIENT
Start: 2018-08-05 | End: 2018-08-05

## 2018-08-05 RX ORDER — OXYTOCIN/0.9 % SODIUM CHLORIDE 30/500 ML
100 PLASTIC BAG, INJECTION (ML) INTRAVENOUS CONTINUOUS
Status: DISCONTINUED | OUTPATIENT
Start: 2018-08-05 | End: 2018-08-06 | Stop reason: HOSPADM

## 2018-08-05 RX ORDER — CARBOPROST TROMETHAMINE 250 UG/ML
250 INJECTION, SOLUTION INTRAMUSCULAR
Status: DISCONTINUED | OUTPATIENT
Start: 2018-08-05 | End: 2018-08-05

## 2018-08-05 RX ORDER — LIDOCAINE HYDROCHLORIDE AND EPINEPHRINE 15; 5 MG/ML; UG/ML
INJECTION, SOLUTION EPIDURAL PRN
Status: DISCONTINUED | OUTPATIENT
Start: 2018-08-05 | End: 2018-08-06 | Stop reason: HOSPADM

## 2018-08-05 RX ADMIN — ACETAMINOPHEN 650 MG: 325 TABLET, FILM COATED ORAL at 12:19

## 2018-08-05 RX ADMIN — PENICILLIN G POTASSIUM 5 MILLION UNITS: 5000000 POWDER, FOR SOLUTION INTRAMUSCULAR; INTRAPLEURAL; INTRATHECAL; INTRAVENOUS at 02:56

## 2018-08-05 RX ADMIN — SODIUM CHLORIDE 2.5 MILLION UNITS: 9 INJECTION, SOLUTION INTRAVENOUS at 07:03

## 2018-08-05 RX ADMIN — ROPIVACAINE HYDROCHLORIDE 10 ML: 2 INJECTION, SOLUTION EPIDURAL; INFILTRATION at 09:15

## 2018-08-05 RX ADMIN — IBUPROFEN 800 MG: 400 TABLET ORAL at 12:19

## 2018-08-05 RX ADMIN — LIDOCAINE HYDROCHLORIDE AND EPINEPHRINE 3 ML: 15; 5 INJECTION, SOLUTION EPIDURAL at 09:11

## 2018-08-05 RX ADMIN — Medication 12 ML/HR: at 09:45

## 2018-08-05 RX ADMIN — FENTANYL CITRATE 100 MCG: 50 INJECTION, SOLUTION INTRAMUSCULAR; INTRAVENOUS at 09:15

## 2018-08-05 RX ADMIN — ACETAMINOPHEN 650 MG: 325 TABLET, FILM COATED ORAL at 18:33

## 2018-08-05 RX ADMIN — SODIUM CHLORIDE, POTASSIUM CHLORIDE, SODIUM LACTATE AND CALCIUM CHLORIDE 125 ML/HR: 600; 310; 30; 20 INJECTION, SOLUTION INTRAVENOUS at 02:56

## 2018-08-05 RX ADMIN — SODIUM CHLORIDE, POTASSIUM CHLORIDE, SODIUM LACTATE AND CALCIUM CHLORIDE 125 ML/HR: 600; 310; 30; 20 INJECTION, SOLUTION INTRAVENOUS at 07:03

## 2018-08-05 RX ADMIN — DOCUSATE SODIUM 100 MG: 100 CAPSULE, LIQUID FILLED ORAL at 12:19

## 2018-08-05 RX ADMIN — DOCUSATE SODIUM 100 MG: 100 CAPSULE, LIQUID FILLED ORAL at 20:33

## 2018-08-05 RX ADMIN — OXYTOCIN-SODIUM CHLORIDE 0.9% IV SOLN 30 UNIT/500ML 340 ML/HR: 30-0.9/5 SOLUTION at 10:45

## 2018-08-05 RX ADMIN — IBUPROFEN 800 MG: 400 TABLET ORAL at 18:33

## 2018-08-05 RX ADMIN — SODIUM CHLORIDE, POTASSIUM CHLORIDE, SODIUM LACTATE AND CALCIUM CHLORIDE 1000 ML: 600; 310; 30; 20 INJECTION, SOLUTION INTRAVENOUS at 08:40

## 2018-08-05 ASSESSMENT — ACTIVITIES OF DAILY LIVING (ADL)
TRANSFERRING: 0-->INDEPENDENT
DRESS: 0-->INDEPENDENT
AMBULATION: 0-->INDEPENDENT
COGNITION: 0 - NO COGNITION ISSUES REPORTED
BATHING: 0-->INDEPENDENT
RETIRED_EATING: 0-->INDEPENDENT
TOILETING: 0-->INDEPENDENT
FALL_HISTORY_WITHIN_LAST_SIX_MONTHS: NO
RETIRED_COMMUNICATION: 0-->UNDERSTANDS/COMMUNICATES WITHOUT DIFFICULTY
SWALLOWING: 0-->SWALLOWS FOODS/LIQUIDS WITHOUT DIFFICULTY

## 2018-08-05 NOTE — PROGRESS NOTES
KRYSTYNA PROGRESS NOTE    SUBJECTIVE:  Elizabeth is sitting up in the chair, breathing through contractions. She states from 3037-3019 she had one contraction every half hour and since 0600 they have been every 10 minutes. She is coping well with contractions and is hoping to get into the tub soon. Will is present at the bedside.    OBJECTIVE:  /85  Pulse 99  Temp 98  F (36.7  C) (Axillary)  Resp 16  LMP 10/11/2017    Fetal heart tones: Baseline 125  Variability: moderate  Accelerations: present  Decelerations: absent    Contractions: Pt is alicia every 6-10 minutes, lasting 50 seconds and palpates mild    Cervix: 7/ 90% / -1, Vtx  ROM: AROM small amount of clear fluid at 0714    Pitocin- none  Antibiotics- PCN for GBS prophylaxis     ASSESSMENT:  IUP @ 41w2d active labor   GBS- positive  AROM small amount clear fluid   Intermittent fetal monitoring      PLAN:   Discussed importance of continued movement and position changes to facilitate fetal descent and optimal position. Elizabeth plans to get into the tub at this time.   comfort measures prn   Pain medication as requested by Elizabeth  Prophylactic antibiotic for + GBS status  Anticipate   Labor augmentation with AROM  reevaluate in 2 or sooner PRN. Consider augmentation with Pitocin PRN    BECKY Neal  [facility] am serving as a scribe; to document services personally performed by Jcay POST CNM- based on data collection and the provider's statements to me.    Provider Disclosure:  I agree with above History, Review of Systems, Physical exam and Plan. I have reviewed the content of the documentation and have edited it as needed. I have personally performed the services documented here and the documentation accurately represents those services and the decisions I have made.    Jacy POST CNM

## 2018-08-05 NOTE — IP AVS SNAPSHOT
[address]    [place] [address]    Phone:  [phone]                                       After Visit Summary   8/5/2018    Elizabeth Crooks    MRN: [id number]           After Visit Summary Signature Page     I have received my discharge instructions, and my questions have been answered. I have discussed any challenges I see with this plan with the nurse or doctor.    ..........................................................................................................................................  Patient/Patient Representative Signature      ..........................................................................................................................................  Patient Representative Print Name and Relationship to Patient    ..................................................               ................................................  Date                                            Time    ..........................................................................................................................................  Reviewed by Signature/Title    ...................................................              ..............................................  Date                                                            Time

## 2018-08-05 NOTE — H&P
[facility] Labor Admission History & Physical    Elizabeth Crooks is a 33 year old  with an IUP at 41w2d  ; ,   Partner/support Person: Will  Language Barrier: English  [facility]  Provider: CNM's    Elizabeth Crooks is admitted to the Birthplace at [facility] on 2018 at 3:04 AM       History of present inllness/Chief Complaint:  Elizabeth presents to the Birthplace in active labor. She states she had been having intermittent contractions for the last 3 days and at  on Saturday evening they became more intense and every 15 minutes; by midnight her contractions were stronger, every 4-6 minutes.     Here with: spontaneous onset of labor  Patient reports contractions are Regular           Baby active Yes  Membranes are intact.  Bloody show No   Any changes with medical history since last prenatal visit No      Obstetrical history  Estimated Date of Delivery: 2018 determined by ultrasound   Patient's last menstrual period was 10/11/2017.   Dating U/S: 3/14/2018    Fetal anatomic survey: Normal  Placenta: posterior    PRENATAL COURSE  Prenatal care began at 7 wks gestation for a total of 15 prenatal visits.  Total wt gain 39; There is no height or weight on file to calculate BMI.  Prenatal Blood Pressure: WNL  Prenatal course was complicated by obesity, hypothyroidism   Tdap: 2018      Patient Active Problem List   Diagnosis     Supervision of high risk pregnancy in third trimester     Obesity affecting pregnancy in third trimester     Pap smear abnormality of cervix/human papillomavirus (HPV) positive     Hypothyroidism affecting pregnancy, antepartum     BMI 36.0-36.9,adult     Labor and delivery, indication for care       HISTORY  No Known Allergies  Past Medical History:   Diagnosis Date     Thyroid disease      Past Surgical History:   Procedure Laterality Date     GYN SURGERY  2017    colopos     Family History   Problem Relation Age of  Onset     Other Cancer Mother      Other Cancer Maternal Grandfather      Other Cancer Paternal Grandfather      Social History   Substance Use Topics     Smoking status: Never Smoker     Smokeless tobacco: Never Used     Alcohol use No      Comment: none since pg     Obstetric History       T1      L1     SAB0   TAB0   Ectopic0   Multiple0   Live Births1       # Outcome Date GA Lbr Yao/2nd Weight Sex Delivery Anes PTL Lv   2 Current            1 Term 10/11/16    M  EPI  MAREK          LABS:  Lab Results   Component Value Date    ABO A 2018    RH Pos 2018    AS Neg 2017    HGB 11.8 2018    HEPBANG Nonreactive 2017    CHPCRT Negative 2017    GCPCRT Negative 2017    TREPAB Negative 2017       GBS Status:   Lab Results   Component Value Date    GBS Positive (A) 07/10/2018     Rubella: Immune   HIV: Non-Reactive   Platelets:  334  1hr GCT:  82    ROS   Pt is alert and oriented  Pt denies significant constitutional symptoms including fever and/or malaise.    Pt denies significant respiratory, cardiovacular, GI, or muscular/skeletal complaints.    Neuro: Denies HA and visual changes  Muscoloskeletal: Denies except for discomforts r/t pregnancy     PHYSICAL EXAM:  /89  Pulse 99  Temp 98.4  F (36.9  C) (Axillary)  Resp 20  LMP 10/11/2017  General appearance:  healthy, alert and active   Heart: RRR  Lungs: normal respiratory effort  Abdomen: gravid, single vertex fetus, non-tender, EFW 8.5 lbs.   Legs:  No edema     Contractions: Pt is alicia every 4-7 minutes, lasting 50-60 seconds and palpates mild    Fetal heart tones: Baseline 120   Variability: moderate   Accelerations: present  Decelerations: absent    NST: reactive    Cervix: 6/ 90%/soft/ -1, Vtx per RN exam   Bloody show: no  Membranes:  intact    ASSESSMENT:  33 year old  with jimenez IUP 41w2d in active labor  NST reactive  GBS negative and membranes intact   Hypothyroidism    Obesity     PLAN:  Routine CNM care  Labs ordered: Hemoglobin/Platelets and type and hold  Teaching done r/t comfort measures, pain management options, and labor processes  Admit - see IP orders  Pain medication as requested by Elizabeth   Prophylactic antibiotic for + GBS status  Will reassess 1-2 hours   Anticipate BECKY Botello  [facility] WomenIvy [place], am serving as a scribe; to document services personally performed by Jacy POST CNM- based on data collection and the provider's statements to me.    Provider Disclosure:  I agree with above History, Review of Systems, Physical exam and Plan. I have reviewed the content of the documentation and have edited it as needed. I have personally performed the services documented here and the documentation accurately represents those services and the decisions I have made.    Jacy POST CNM

## 2018-08-05 NOTE — PROGRESS NOTES
KRYSTYNA PROGRESS NOTE    SUBJECTIVE:  Elizabeth is resting comfortably in bed, she was able to take a short nap after being up on the birth ball. She states her contractions have spaced out since arriving at the hospital.     OBJECTIVE:  /59  Pulse 99  Temp 98.3  F (36.8  C) (Temporal)  Resp 16  LMP 10/11/2017    Fetal heart tones:    Per IA FHR:  Increases: heard  Decreases: not heard    Contractions: Pt is alicia every 7-10 minutes per palpation and patient report     Cervix: deferred   ROM: not ruptured    Pitocin- none  Antibiotics- PCN for GBS prophylaxis      ASSESSMENT:  IUP @ 41w2d active labor   GBS- positive  Intermittent fetal monitoring       PLAN:   Discussed options for labor augmentation, AROM and/or Pitocin if contractions continue to be great than every 7 minutes apart. Elizabeth would like to continue to rest until 0700 and plan to check her cervix at that time.   Continue to utilize position changes and movement when not sleeping.   comfort measures prn   Pain medication as requested by Elizabeth   Prophylactic antibiotic for + GBS status  Anticipate   Plan to reevaluate at 0700 or sooner ANGELN    BECKY Neal  [facility]    Ivy MELO,  am serving as a scribe; to document services personally performed by Jacy POST CNM- based on data collection and the provider's statements to me.    Provider Disclosure:  I agree with above History, Review of Systems, Physical exam and Plan. I have reviewed the content of the documentation and have edited it as needed. I have personally performed the services documented here and the documentation accurately represents those services and the decisions I have made.    SINCERE Hoskins, KRYSTYNA

## 2018-08-05 NOTE — PLAN OF CARE
0700- IV fluids re-started for Antibiotics to be given, PCN hung. Fetal monitors applied. CNM Jacy Martin at bedside. pplan of care reviewed. Will rupture membranes and see if labor resumes.  0714- SVE- with AROM. Clear fluid, small amount. 7 cm, 90%, -1 station per CNM. Will start Pitocin in couple hours if not in active labor.  0720- report to Hortencia ZARATE RN.

## 2018-08-05 NOTE — ANESTHESIA PROCEDURE NOTES
Peripheral nerve/Neuraxial procedure note : epidural catheter  Pre-Procedure  Performed by MILTON LEONARD  Location: OB      Pre-Anesthestic Checklist: patient identified, IV checked, risks and benefits discussed, informed consent and pre-op evaluation    Timeout  Correct Patient: Yes   Correct Procedure: Yes   Correct Site: Yes   Correct Laterality: N/A   Correct Position: Yes   Site Marked: N/A   .   Procedure Documentation    .    Procedure:    Epidural catheter.  Insertion Site:L3-4  (midline approach) Injection technique: LORT saline   Local skin infiltrated with mL of 1% lidocaine.  ARLENE at 7 cm     Patient Prep;mask, sterile gloves, povidone-iodine 7.5% surgical scrub, patient draped.  .  Needle: [employer] needle Needle Gauge: 17.    Needle Length (Inches) 3.5  .   . .  Catheter threaded easily  5 cm epidural space.  12 cm at skin.   .    Assessment/Narrative  Paresthesias: No.  .  .  Aspiration negative for heme or CSF  . Test dose of 3 mL lidocaine 1.5% w/ 1:200,000 epinephrine at. Test dose negative for signs of intravascular, subdural or intrathecal injection. Comments:  [place] ARLENE at 7 cm.  Catheter threaded easily.  Negative aspiration.  Negative test dose.  No abnormal pain or paresthesia throughout.  Patient tolerated well.

## 2018-08-05 NOTE — PLAN OF CARE
"0129:  presents to MAC at 41 2/7 weeks gestation c/o \"contractions every 4-7 minutes for 2 hours.\" External monitors applied after verbal consent by patient. Vital signs obtained. Admission database obtained and prenatal record reviewed.  0200:SVE    0202: Monitors removed per patient request.  0210: Jacy Martin CNM updated via telephone. Orders received to admit to labor and delivery. Will start IVAB for GBS+.  0230: SBAR report to Verónica ASENCIO RN to assume all cares for this patient.  "

## 2018-08-05 NOTE — TELEPHONE ENCOUNTER
Jacy Mario paged at 12:41 a.m.  Elizabeth Crooks, 1985, [id number]  Due date was 7/27/2018. Believes in labor  [phone]. 2nd pregnancy. Has seen Tabatha Ortega mostly.  Zena Johnson will call back and request a second page if she has not been called by 12:50a.mary ann MURRY RN [facility]

## 2018-08-05 NOTE — PLAN OF CARE
0235- report received, care assumed, patient hoping for natural birth. +GBS,   0245- IV started in left forearm. Patient up on birthing ball.  0300- Pcn Hung.  0315-  with accelerations heard. No decelerations. Contractions greater than 7 minutes.  0345- patient states not many contractions. Will rest a bit before walking. Patient into bed  0400-  with accelerations, no decelerations. Contractions still greater than 7 minutes apart. Vitals stable. Patient resting.

## 2018-08-05 NOTE — L&D DELIVERY NOTE
Delivery Note    CNM in room at 1010. Elizabeth straight catheterized with 100 mL urine expelling. SVE showed complete/-2. Discussed options or laboring down vs pushing. Elizabeth did not feel urge to push or contractions. Test pushes attempted with good results. Decision to continue pushing. Unable to capture FHR tracing throughout pushing. FHR heard intermittently through external monitors. FHR in 110s and 120s with no audible decelerations.    IUP at 41w2d gestation delivered on 2018.     delivery of a viable Female infant - parents still deciding on name.  Apgars of 9 at 1 minute and 9 at 5 minutes.  NNP present for delivery? No.  Labor was spontaneous.    Medications administered  in labor:  Pain Rx: Epidural. Antibiotics: Yes: PCN for GBS status.  Perineum: Intact. Repaired: No.   Delayed cord clamping: Yes   Placenta-mechanism: spontaneous, intact,  with a 3 vessel cord. IV oxytocin was given.  Quantitative Blood Loss:  100 cc's  Complications of pregnancy, labor and delivery: 1) maternal obesity 2) hypothyroidism 3) GBS positive status.    Chico Hussein, SINCERE CNM  Delivery Summary    Elizabeth Crooks MRN# [id number]   Age: 33 year old YOB: 1985     ASSESSMENT & PLAN:     1) : IV Pitocin given. No laceration. Continue with normal postpartum cares.    2) GBS positive: adequately treated    3) Lactating mother: Continue to support breastfeeding. Offer lactation services as needed.    4) Hypothyroidism: Continue with synthroid. TSH/T4 labs at 6-8 weeks postpartum        Labor Length    3rd Stage (hrs):  0 (min):  8      Labor Events    Labor Type:  Spontaneous, AROM   Predominate monitoring during 1st stage:  intermittent auscultation      Antibiotics received during labor?:  Yes   Reason for Antibiotics:  GBS   Antibiotics received for GBS:  Penicillin      Rupture identifier:  Rupture 1   Rupture date/time: 18 0714   Rupture type:  Artificial Rupture of Membranes   Fluid  color:  Clear      Delivery/Placenta Date and Time    Delivery Date:  18 Delivery Time:  10:42 AM   Placenta Date/Time:  2018 10:50 AM   Oxytocin given at the time of delivery:  after delivery of baby      Vaginal Counts    Initial count performed by 2 team members:   Two Team Members   Chico Aquino RN          Burket S[employer]   Initial counts 2 0 5    Added to count       Final counts 2 0 5       Placed during labor Accounted for at the end of labor   No NA   No NA   No NA      Final count performed by 2 team members:   Two Team Members   Chico Aquino RN         Final count correct?:  Yes         Apgars    Living status:  Living    1 Minute 5 Minute 10 Minute 15 Minute 20 Minute   Skin color: 1  1       Heart rate: 2  2       Reflex irritability: 2  2       Muscle tone: 2  2       Respiratory effort: 2  2       Total: 9  9          Apgars assigned by:  DAYANA LOPEZ RN      Cord    Vessels:  3 Vessels Complications:  None   Cord Blood Disposition:  Lab Gases Sent?:  No          Resuscitation    Methods:  None         Skin to Skin and Feeding Plan    Skin to skin initiation date/time: 18 1042   Skin to skin with:  Mother   Skin to skin end date/time:     How do you plan to feed your baby:  Breastfeeding      Labor Events and Shoulder Dystocia    Fetal Tracing Comments:  Unable to capture tracing during pushing. Haakon FHT intermittently through external monitor. FHR heard in 110s and 120s; no decels heard.   Shoulder dystocia present?:  Neg            Delivery (Maternal) (Provider to Complete) ([id number])    Episiotomy:  None   Perineal lacerations:  None    Vaginal laceration?:  No    Cervical laceration?:  No          Mother's Information  Mother: Elizabeth Crooks #[id number]    Start of Mother's Information     IO Blood Loss  18 2242 - 18 1112    Mom's I/O Activity            End of Mother's Information  Mother:  Elizabeth Crooks #[id number]            Delivery - Provider to Complete ([id number])    Delivering clinician:  LYNN LAZAR CNM Care:  Exclusive CNM care in labor   Attempted Delivery Types (Choose all that apply):  Spontaneous Vaginal Delivery   Delivery Type (Choose the 1 that will go to the Birth History):  Vaginal, Spontaneous Delivery                           Placenta    Delayed Cord Clamping:  Done   Date/Time:  8/5/2018 10:50 AM   Removal:  Spontaneous   Disposition:  Hospital disposal      Presentation and Position    Presentation:  Vertex   Position:  Right Occiput Anterior                    SINCERE Dorsey CNM

## 2018-08-05 NOTE — IP AVS SNAPSHOT
MRN:[id number]                      After Visit Summary   8/5/2018    Elizabeth Crooks    MRN: [id number]           Thank you!     Thank you for choosing [facility] for your care. Our goal is always to provide you with excellent care. Hearing back from our patients is one way we can continue to improve our services. Please take a few minutes to complete the written survey that you may receive in the mail after you visit with us. Thank you!        Patient Information     Date Of Birth          1985        Designated Caregiver       Most Recent Value    Caregiver    Will someone help with your care after discharge? no    Name of designated caregiver Will    Phone number of caregiver [phone]      About your hospital stay     You were admitted on:  August 5, 2018 You last received care in the:  [address]    You were discharged on:  August 6, 2018        Reason for your hospital stay       Maternity care                  Who to Call     For medical emergencies, please call 911.  For non-urgent questions about your medical care, please call your primary care provider or clinic, [phone]          Attending Provider     Provider Specialty    Jacy Martni APRN CNM OB/Gyn       Primary Care Provider Office Phone # Fax #    [facility] [phone]      After Care Instructions     Activity       Review discharge instructions            Diet       Resume previous diet            Discharge Instructions - Postpartum visit       Schedule a postpartum visit with your provider and return to clinic in 6 weeks. Your delivering provider with call you for follow-up in 2-3 weeks.                  Further instructions from your care team       Postpartum Vaginal Delivery Instructions    Activity       Ask family and friends for help when you need it.    Do not place anything in your vagina for 6 weeks.    You are not restricted on other  activities, but take it easy for a few weeks to allow your body to recover from delivery.  You are able to do any activities you feel up to that point.    No driving until you have stopped taking your pain medications (usually two weeks after delivery).     Call your health care provider if you have any of these symptoms:       Increased pain, swelling, redness, or fluid around your stiches from an episiotomy or perineal tear.    A fever above 100.4 F (38 C) with or without chills when placing a thermometer under your tongue.    You soak a sanitary pad with blood within 1 hour, or you see blood clots larger than a golf ball.    Bleeding that lasts more than 6 weeks.    Vaginal discharge that smells bad.    Severe pain, cramping or tenderness in your lower belly area.    A need to urinate more frequently (use the toilet more often), more urgently (use the toilet very quickly), or it burns when you urinate.    Nausea and vomiting.    Redness, swelling or pain around a vein in your leg.    Problems breastfeeding or a red or painful area on your breast.    Chest pain and cough or are gasping for air.    Problems coping with sadness, anxiety, or depression.  If you have any concerns about hurting yourself or the baby, call your provider immediately.     You have questions or concerns after you return home.     Keep your hands clean:  Always wash your hands before touching your perineal area and stitches.  This helps reduce your risk of infection.  If your hands aren't dirty, you may use an alcohol hand-rub to clean your hands. Keep your nails clean and short.        Pending Results     No orders found for last 3 day(s).            Statement of Approval     Ordered          08/06/18 0927  I have reviewed and agree with all the recommendations and orders detailed in this document.  EFFECTIVE NOW     Approved and electronically signed by:  Tabatha Ortega APRN CNM             Admission Information     Date & Time  Provider Department Dept. Phone    8/5/2018 Jacy Martin, [facility] [address] [phone]      Your Vitals Were     Blood Pressure Pulse Temperature Respirations Last Period Pulse Oximetry    125/75 (BP Location: Right arm) 77 97.8  F (36.6  C) (Oral) 16 10/11/2017 99%      [employer] Information     [employer] gives you secure access to your electronic health record. If you see a primary care provider, you can also send messages to your care team and make appointments. If you have questions, please call your primary care clinic.  If you do not have a primary care provider, please call [phone] and they will assist you.        Care EveryWhere ID     This is your Care EveryWhere ID. This could be used by other organizations to access your [facility] medical records  [id number]        Equal Access to Services     KYLE ROCHA : Rosalio Chau, donna grayson, yadira lawton . [facility] [phone].    ATENCIÓN: Si habla español, tiene a mcrae disposición servicios gratuitos de asistencia lingüística. Fernanda al [phone].    We comply with applicable federal civil rights laws and [place] laws. We do not discriminate on the basis of race, color, national origin, age, disability, sex, sexual orientation, or gender identity.               Review of your medicines      UNREVIEWED medicines. Ask your doctor about these medicines        Dose / Directions    VITAMIN D-3 PO        Dose:  4000 Units   Take 4,000 Units by mouth   Refills:  0         START taking        Dose / Directions    acetaminophen 325 MG tablet   Commonly known as:  TYLENOL   Used for:  Postpartum pain        Dose:  650 mg   Take 2 tablets (650 mg) by mouth every 4 hours as needed for mild pain or fever (greater than or equal to 38? C /100.4? F (oral) or 38.5? C/ 101.4? F (core).)   Quantity:  100 tablet   Refills:  0       hydrocortisone 2.5 % cream         Place rectally 3 times daily as needed (hemorrhoids)   Refills:  0       ibuprofen 600 MG tablet   Commonly known as:  ADVIL/MOTRIN   Used for:  Postpartum pain        Dose:  600 mg   Take 1 tablet (600 mg) by mouth every 6 hours as needed for other (cramping)   Refills:  0       lanolin ointment        Apply topically every hour as needed for dry skin (sore nipples)   Refills:  0         CONTINUE these medicines which have NOT CHANGED        Dose / Directions    levothyroxine 50 MCG tablet   Commonly known as:  SYNTHROID   Used for:  Supervision of high risk pregnancy in first trimester        Dose:  50 mcg   Take 1 tablet (50 mcg) by mouth daily   Quantity:  90 tablet   Refills:  1       prenatal multivitamin plus iron 27-0.8 MG Tabs per tablet   Used for:  Supervision of high risk pregnancy in first trimester        Dose:  1 tablet   Take 1 tablet by mouth daily   Refills:  0            Where to get your medicines      Some of these will need a paper prescription and others can be bought over the counter. Ask your nurse if you have questions.     You don't need a prescription for these medications     acetaminophen 325 MG tablet    hydrocortisone 2.5 % cream    ibuprofen 600 MG tablet    lanolin ointment                Protect others around you: Learn how to safely use, store and throw away your medicines at www.disposemymeds.org.             Medication List: This is a list of all your medications and when to take them. Check marks below indicate your daily home schedule. Keep this list as a reference.      Medications           Morning Afternoon Evening Bedtime As Needed    acetaminophen 325 MG tablet   Commonly known as:  TYLENOL   Take 2 tablets (650 mg) by mouth every 4 hours as needed for mild pain or fever (greater than or equal to 38? C /100.4? F (oral) or 38.5? C/ 101.4? F (core).)   Last time this was given:  650 mg on 8/6/2018  2:03 PM                                hydrocortisone 2.5 % cream    Place rectally 3 times daily as needed (hemorrhoids)                                ibuprofen 600 MG tablet   Commonly known as:  ADVIL/MOTRIN   Take 1 tablet (600 mg) by mouth every 6 hours as needed for other (cramping)   Last time this was given:  800 mg on 8/6/2018  2:02 PM                                lanolin ointment   Apply topically every hour as needed for dry skin (sore nipples)                                levothyroxine 50 MCG tablet   Commonly known as:  SYNTHROID   Take 1 tablet (50 mcg) by mouth daily                                prenatal multivitamin plus iron 27-0.8 MG Tabs per tablet   Take 1 tablet by mouth daily                                VITAMIN D-3 PO   Take 4,000 Units by mouth                                          More Information        After Giving Birth: How to Feel Healthy    Helping yourself feel fit is one of the best things you can do for your baby. A little exercise will tone your muscles. You ll feel stronger and more energized. You ll also feel more awake and aware. Don t worry about your weight right now. Your goal is to feel healthy. Part of feeling good is dressing for comfort. If you dress  smart,  you can be a busy new mom and still look great.  Continue Kegel exercises  You may have been told to do Kegel exercises during pregnancy. These exercises strengthen the muscles that are strained by carrying and delivering the baby. You can return to your Kegels as soon as you feel ready. Why not start today? Squeeze your pelvic floor muscles (the ones that control your urine stream) for at least 5 seconds. Relax, then squeeze again. Work your way up to 50 or 100 Kegels a day.  Exercise often  Exercise helps you get in shape. It also strengthens your muscles, so you are better fit for lifting the baby. As an added benefit, exercise gives you a sense that you re doing something good for yourself. Take your baby for a short walk, or spend 10 minutes stretching. If you were  active during pregnancy, you can probably begin light exercise as soon as you feel ready. But be sure to check with your healthcare provider before you begin.  Stay off the scale  For the first month, think about regaining energy and feeling good, not about losing weight. Losing weight too soon can make you feel more tired. Instead, focus on caring for your baby and eating balanced meals. You may lose some weight without even trying, especially if you re breastfeeding. Once your energy level is back to normal, you can begin to lose weight. A gradual weight loss of 4 or 5 pounds a month is safest.  Pelvic tilt  Lie on your back, with your knees bent and your feet flat on the floor. Now tighten the muscles in your belly and buttocks. As you inhale, press down until your low back flattens against the floor. Hold for a moment, then relax. Repeat 5 times twice a day.  Abdominal curl  Lie on your back with your knees bent and feet flat on the floor. Cross your arms over your chest. Exhale and tighten the muscles in your belly. Gently raise your shoulders off the floor. Hold for 5 counts. Slowly lower your shoulders. Relax, then repeat 3 to 5 times twice a day.  Dress smart  You ll want to be comfortable during the first days after delivery. Wear a robe, pajamas, or sweats -- whatever feels best. Soon you may want to look more like your prepregnant self. Do your hair and wear makeup, if you normally do. A loose-fitting dress may feel good. But do yourself a favor: Don t reach for your jeans. It s likely to be a month or more before you can wear them. If leaking breasts are a problem, put pads inside your bra and dress in layers. If you re breastfeeding, shirts that open in front or pullover tops are good choices. A scarf or shawl can be used as a drape if you breastfeed when others are present.     When to call your healthcare provider  Remember to schedule your postpartum visit. If you delivered by , be seen  within 2 weeks. For vaginal delivery, be seen 4 to 6 weeks after the birth. Also, call your healthcare provider if you have heavy bleeding, fever, redness or persistent lump in breasts, unable to void, unable to have a bowel movement after 1 week, severe pain, or worsening depression.   Date Last Reviewed: 8/16/2015 2000-2017 [employer]. [address] [place] [address], [place] [address]. All rights reserved. This information is not intended as a substitute for professional medical care. Always follow your healthcare professional's instructions.                Breastfeeding: Caring for Yourself  When you have a new little person in your life, it s easy to forget about yourself. There are new demands on your time. There are also new responsibilities. But it s important to take care of yourself. This will help you take better care of your new baby.    Healthy habits  Here are some healthy tips:    Get exercise when you can. If you leak milk, it will help to nurse right before the activity.    Avoid smoking. Smoking is unhealthy for you and may cause you to make less milk. Secondhand smoke is also harmful to your baby.    Talk to your healthcare provider about alcohol, if you choose to drink.    When you re sick, tell your healthcare provider that you are breastfeeding. Few medicines and illnesses affect breastfeeding, but it is important to check.    Ask your healthcare provider before taking any prescription or over-the-counter medicines, herbs, or supplements.  Comfy clothes  Suggestions for being comfortable when breastfeeding include:    Find a comfortable nursing bra. Many women find underwire uncomfortable. Some stores offer on-site fittings. Ask your healthcare provider or nurse for a referral.    If you have leaking milk, place breast pads inside your bra.    Choose an extra-supportive bra for exercise. Or you can wear two bras at the same time for more support.    Wear loose tops that can be lifted  for breastfeeding. You can also buy clothes specially made for breastfeeding moms.  A note about sex  After delivery, it may take a while before your interest in sex returns. Share your feelings with your partner. Your healthcare provider will let you know when it is safe to resume having sex. When you re ready, know that:    There are several forms of birth control that can be used while breastfeeding. Ask your healthcare provider what to use for pregnancy prevention while you are nursing.    Breastfeeding hormones may cause vaginal dryness. Some women find using a water-based lubricant makes sex more comfortable.    Milk may let down when you are aroused. Applying pressure on the nipple, using breast pads, or a towel may help with this.  When to call your healthcare provider  Call your healthcare provider if:    You feel overwhelmed and don't know where to turn.    You feel very sad or don t want to be with your baby.    You feel like your baby cries all the time and won't be soothed    You are unable to exercise, or have sex, without discomfort.    You are unsure about a medicine, illness, or activity and its effect on breastfeeding.   Date Last Reviewed: 9/7/2015 2000-2017 [employer]. [address] [place] [address]. All rights reserved. This information is not intended as a substitute for professional medical care. Always follow your healthcare professional's instructions.                Understanding Postpartum Depression  You ve just had a baby. You expected to be excited and happy. But instead you find yourself crying for no reason. You may have trouble coping with your daily tasks. You feel sad, tired, and hopeless most of the time. You may even feel ashamed or guilty. But what you re going through is not your fault and you can feel better. Talk to your healthcare provider. He or she can help.    What is depression?  Depression is a mood disorder that affects the way you think  "and feel. The most common symptom is a feeling of deep sadness. You may also feel as if you just can t cope with life. Other symptoms include:    Gaining or losing a lot of weight    Sleeping too much or too little    Feeling tired all the time    Feeling restless    Crying a lot    Having too little or too much appetite.    Withdrawing from friends and family    Having headaches, aches and pains, or stomach problems that won't go away.    Fears of harming your baby    Lack of interest in your baby    Feeling worthless or guilty    No longer finding pleasure in things you used to    Having trouble thinking clearly or making decisions    Thinking about death or suicide   Depression after childbirth  You may be weepy and tired right after giving birth. These feelings are normal. They re sometimes called the  baby blues.  These blues go away after 1 to 2 weeks. However, postpartum (meaning  after birth ) depression lasts much longer and is more severe than the \"baby blues.\" It can make you feel sad and hopeless. You may also fear that your baby will be harmed and worry about being a bad mother.  What causes postpartum depression?  The exact cause of postpartum depression is unknown. Changes in brain chemistry or structure are believed to play a big role in depression. It may be due to changes in your hormones during and after childbirth. You may also be tired from caring for your baby and adjusting to being a mother. All these factors may make you feel depressed. In some cases, your genes may also play a role.  Depression can be treated  There are many ways to treat postpartum depression. Talking to your healthcare provider is the first step toward feeling better.  When to call your healthcare provider  Call your healthcare provider if you:     Cry for no clear reason    Have trouble sleeping, eating, and making choices    Questions whether you can handle caring for a baby    Have intense feelings of sadness, anxiety, " or despair that prevent you from being able to do your daily tasks  Resources    National [place] of Mental Fnmshl133-841-1956jja.[facility].nih.gov    National [place] on Mental Shxecvy833-105-3616dmp.husam.org    Mental Health Smeqvgy771-394-8508axe.[facility].org    National Suicide Mkzdusk481-917-2974 (800-SUICIDE)   Date Last Reviewed: 8/16/2015 2000-2017 [employer]. [address] [place] [address]. All rights reserved. This information is not intended as a substitute for professional medical care. Always follow your healthcare professional's instructions.                After Delivery: When to Call the Healthcare Provider  Health problems sometimes arise with you or your baby following delivery. Call your baby's healthcare provider or your healthcare provider if you see any of the signs below.      Watch your baby for these signs  Call your baby s healthcare provider if your baby:    Has a fever (see Fever and children, below)    Has fewer than 6 wet diapers a day (Hint: Disposable diapers may feel heavy or hard after being soaked.)    Skin or whites of the eyes appear yellow    Cries for a long time, or if it sounds as if the cries are caused by pain    Has diarrhea    Refuses 2 feedings in a row    Is inactive or listless    Is vomiting    Has blood in the stool or vomit    Has a rash    Has ear drainage    Has trouble breathing    Has a seizure    Will not wake up  Trust your instincts. If you are concerned about your baby, call your baby's healthcare provider.  Fever and children  Always use a digital thermometer to check your child s temperature. Never use a mercury thermometer.  For infants and toddlers, be sure to use a rectal thermometer correctly. A rectal thermometer may accidentally poke a hole in (perforate) the rectum. It may also pass on germs from the stool. Always follow the product maker s directions for proper use. If you don t feel comfortable taking a rectal temperature,  use another method. When you talk to your child s healthcare provider, tell him or her which method you used to take your child s temperature.  Here are guidelines for fever temperature. Ear temperatures aren t accurate before 6 months of age. Don t take an oral temperature until your child is at least 4 years old.  Infant under 3 months old:  Ask your child s healthcare provider how you should take the temperature.    Rectal or forehead (temporal artery) temperature of 100.4 F (38 C) or higher, or as directed by the provider   Watch your own health for these signs  Call your own healthcare provider if you have:    Burning or pain in your breasts    Red streaks or hard lumpy areas in your breasts    Problems with breastfeeding    A fever of 100.4 F (38 C) or higher, or as directed by your healthcare provider    Extreme tiredness or body aches, as if you have the flu    Feelings of extreme sadness or anxiety, or a feeling that you don t want to be with your baby    Abdominal pain that isn t relieved with medicine    Vaginal discharge that has a bad odor    Vaginal bleeding that soaks more than one pad per hour    If you had a  section, call for concerns about your incision site such as pain, drainage, or bleeding from your incision  Date Last Reviewed: 2016-2017 [employer]. [address] [place] [address], [place] [address]. All rights reserved. This information is not intended as a substitute for professional medical care. Always follow your healthcare professional's instructions.

## 2018-08-05 NOTE — PROGRESS NOTES
"CNM PROGRESS NOTE    SUBJECTIVE:    CNM in room from 0830 to 0940.    Elizabeth is now resting comfortably. She received an epidural about 30 minutes ago. She stated that she arrived at 0100 at 6 cm and this has gone on way too long. After the epidural was placed she became increasingly more comfortable. At this time she states she feels increased pressure - like a \"bowling ball pushing on my vagina.\" She does not feel an urge to push at this time.     OBJECTIVE:  /77  Pulse 99  Temp 98.4  F (36.9  C) (Temporal)  Resp 18  LMP 10/11/2017  SpO2 96%    After epidural BP were elevated with contractions and Elizabeth was tensing with each BP check. After she was repositioned and comfortable BP were 130s/70s    Fetal heart tones: Baseline 125   Variability: moderate  Accelerations: present  Decelerations: absent    Contractions: Pt is alicia every 3-5 minutes, lasting 60 seconds and palpates moderate - Contractions felt via palpation    Cervix: Not checked at this time.  ROM: clear fluid    Pitocin- none  Antibiotics- PCN  Cervical ripening: N/A    ASSESSMENT:  IUP @ 41w2d active labor   GBS- positive  SROM since 715    PLAN:   Straight catheterize and SVE at 0810. At that time we can make a plan on if other augmentation methods should be used.  Epidural in place for pain relief.   Anticipate .    SINCERE Dorsey CNM      "

## 2018-08-05 NOTE — PROGRESS NOTES
Data: Elizabeth Crooks transferred to 428 via wheelchair at 1330. Baby transferred via parent's arms.  Action: Receiving unit notified of transfer: Yes. Patient and family notified of room change. Report given to Baylee March RN at 1345. Belongings sent to receiving unit. Accompanied by Registered Nurse. Oriented patient to surroundings. Call light within reach.  Response: Patient tolerated transfer and is stable.

## 2018-08-05 NOTE — ANESTHESIA PREPROCEDURE EVALUATION
Anesthesia Evaluation     .             ROS/MED HX    ENT/Pulmonary:       Neurologic:       Cardiovascular:  - neg cardiovascular ROS       METS/Exercise Tolerance:     Hematologic:         Musculoskeletal:         GI/Hepatic:         Renal/Genitourinary:         Endo:         Psychiatric:         Infectious Disease:         Malignancy:         Other:                     Physical Exam  Normal systems: cardiovascular, pulmonary and dental    Airway   Mallampati: III  TM distance: > 3 FB  Neck ROM: full  Mouth opening: > 3 cm    Dental     Cardiovascular       Pulmonary             (-) no pre-eclampsia    Obesity  hypothyroid           Anesthesia Plan      History & Physical Review  History and physical reviewed and following examination; no interval change.    ASA Status:  2 .  OB Epidural Asa: 2       Plan for Epidural          Postoperative Care      Consents  Anesthetic plan, risks, benefits and alternatives discussed with:  Patient and Patient..                          .

## 2018-08-05 NOTE — PLAN OF CARE
0430- listened to baby for 5 minutes, no contractions. Patient states only one contractions in past half hour. KRYSTYNA Martin in to see. plan of care reviewed. Will wait to check until next dose of antibiotic at 0700. Then may need augmentation with Pitocin, patient agrees.

## 2018-08-06 VITALS
TEMPERATURE: 98.6 F | SYSTOLIC BLOOD PRESSURE: 133 MMHG | DIASTOLIC BLOOD PRESSURE: 81 MMHG | OXYGEN SATURATION: 99 % | HEART RATE: 77 BPM | RESPIRATION RATE: 18 BRPM

## 2018-08-06 LAB
HGB BLD-MCNC: 11.2 G/DL (ref 11.7–15.7)
T PALLIDUM AB SER QL: NONREACTIVE

## 2018-08-06 PROCEDURE — 36415 COLL VENOUS BLD VENIPUNCTURE: CPT | Performed by: ADVANCED PRACTICE MIDWIFE

## 2018-08-06 PROCEDURE — 25000132 ZZH RX MED GY IP 250 OP 250 PS 637: Performed by: ADVANCED PRACTICE MIDWIFE

## 2018-08-06 PROCEDURE — 85018 HEMOGLOBIN: CPT | Performed by: ADVANCED PRACTICE MIDWIFE

## 2018-08-06 RX ORDER — ACETAMINOPHEN 325 MG/1
650 TABLET ORAL EVERY 4 HOURS PRN
Qty: 100 TABLET | Refills: 0 | COMMUNITY
Start: 2018-08-06 | End: 2018-09-17

## 2018-08-06 RX ORDER — HYDROCORTISONE 2.5 %
CREAM (GRAM) TOPICAL 3 TIMES DAILY PRN
Refills: 0 | COMMUNITY
Start: 2018-08-06 | End: 2018-09-17

## 2018-08-06 RX ORDER — LANOLIN 100 %
OINTMENT (GRAM) TOPICAL
COMMUNITY
Start: 2018-08-06 | End: 2018-09-17

## 2018-08-06 RX ORDER — IBUPROFEN 600 MG/1
600 TABLET, FILM COATED ORAL EVERY 6 HOURS PRN
Refills: 0 | COMMUNITY
Start: 2018-08-06 | End: 2018-09-17

## 2018-08-06 RX ADMIN — ACETAMINOPHEN 650 MG: 325 TABLET, FILM COATED ORAL at 14:03

## 2018-08-06 RX ADMIN — IBUPROFEN 800 MG: 400 TABLET ORAL at 01:33

## 2018-08-06 RX ADMIN — IBUPROFEN 800 MG: 400 TABLET ORAL at 14:02

## 2018-08-06 RX ADMIN — ACETAMINOPHEN 650 MG: 325 TABLET, FILM COATED ORAL at 01:33

## 2018-08-06 RX ADMIN — IBUPROFEN 800 MG: 400 TABLET ORAL at 07:54

## 2018-08-06 RX ADMIN — DOCUSATE SODIUM 100 MG: 100 CAPSULE, LIQUID FILLED ORAL at 07:55

## 2018-08-06 NOTE — PLAN OF CARE
Problem: Patient Care Overview  Goal: Plan of Care/Patient Progress Review  Outcome: Improving  VSS.  Pain well controlled, requesting prn pain medications as needed.  Up independently in room.  Fundus is firm and midline.   Working on breastfeeding  and  cares. On pathway. Continue to monitor and notify MD as needed.

## 2018-08-06 NOTE — PLAN OF CARE
CNM Postpartum Discharge Note    SIGNIFICANT PROBLEMS:  Patient Active Problem List    Diagnosis Date Noted     Labor and delivery, indication for care 08/05/2018     Priority: Medium     BMI 36.0-36.9,adult 05/08/2018     Priority: Medium     Hypothyroidism affecting pregnancy, antepartum 02/12/2018     Priority: Medium     Recheck TSH at 28 weeks       Supervision of high risk pregnancy in third trimester 12/13/2017     Priority: Medium     FOB: Will    KEVIN: Jul 27, 2018  A+ Placenta:  Sex: Surprise   Hydrotherapy Hep C neg  Genetic: WNL      Tdap: 5/8      Flu:  12/13/17         GBS: positive   Problems  BMI >35; Needs growth US at 32 weeks  TSH to be drawn at 28w  Pap due postpartum  1 Hr GCT/Hgb: Passed 82, 11.5           Obesity affecting pregnancy in third trimester 12/13/2017     Priority: Medium     BMI >35 Recommendations:    Body mass index is 38.38 kg/(m^2). at first OB    Hbg A1C at 1st OB: 5.1    MFM Fetal Survey:  Declined - will be done at our place  1 hr GCT at 28w:  **    Growth U/S at 32w:  **    Offer weekly NST beginning at 32w (if no IUGR)  Twice weekly BPP's starting at 40w         Pap smear abnormality of cervix/human papillomavirus (HPV) positive 12/13/2017     Priority: Medium     Will be due for repeat pap postpartum.     History of abnormal Pap?  Yes: abnormal +HPV 3 years ago, colpo and biopsy (no precancer per pt), repeated pap in one year ASCUS (+HPV),  Pap in March 2017 (Abnormal with HPV), had a colpo but did not do a biopsy.  Plan to do a follow-up pap/HPV postpartum.              SUBJECTIVE:  Patient is stable and is tolerating acitivity well  Baby is rooming in  Complications since 2 hours post delivery: None  Pain is well controlled.  Patient is taking pain medications, Ibuprofen  Breastfeeding status:initiated   Elimination:  She is voiding without difficulty.  She has not had a bowel movement  Desired contraception Nuvaring  Denies heavy bleeding and passing large clots.  Happy  with her birth experience. Looking forward to being discharged    INTERVAL HISTORY:  /79  Pulse 76  Temp 98.3  F (36.8  C) (Oral)  Resp 16  LMP 10/11/2017  SpO2 99%  Breastfeeding? Unknown    Constitutional: healthy, alert, no distress, cooperative and smiling    Breasts: Currently breastfeeding    Fundus: Uterine fundus is firm, non-tender and at 1  below the level of the umbilicus    Perineum: Perineum is intact and/or well approximated, minimal swelling    Lochia: Lochia is appropriate for the duration of time since delivery.     Postpartum hemoglobin PPD#1 Hemoglobin not done at time of this note  Hemoglobin   Date Value Ref Range Status   2018 11.8 11.7 - 15.7 g/dL Final     Blood type   Lab Results   Component Value Date    ABO A 2018       Lab Results   Component Value Date    RH Pos 2018     Rubella status No results found for: RUBELLAABIGG  History of depression:  no    ASSESSMENT/PLAN:   on 2018   Hypothyroidism: Repeat TSH level at 6 week PPV  Normal postpartum course  Stable Post-partum day #1  Breastfeeding, going well  Complications:none  Postpartum warning s/s reviewed, including bleeding/clots, fever, mastitis & thromboemboli   Diet and rest reviewed  Continue prenatal vitamins while breastfeeding  Birthcontrol planned:Would like the Nuvaring. Had that after her first baby without affecting milk supply. If not Nuvaring, would be okay with a POP. Condoms as a last choice,  Educated on postpartum blues and postpartum depression warnings signs/symptoms  2-3 week phone call follow-up to be done by delivering CNM  Follow-up in 6 weeks with CNMs at [facility]  Plan d/c home today, in the evening    Current Discharge Medication List      CONTINUE these medications which have NOT CHANGED    Details   Cholecalciferol (VITAMIN D-3 PO) Take 4,000 Units by mouth      levothyroxine (SYNTHROID) 50 MCG tablet Take 1 tablet (50 mcg) by mouth  daily  Qty: 90 tablet, Refills: 1    Associated Diagnoses: Supervision of high risk pregnancy in first trimester      Prenatal Vit-Fe Fumarate-FA (PRENATAL MULTIVITAMIN PLUS IRON) 27-0.8 MG TABS per tablet Take 1 tablet by mouth daily    Associated Diagnoses: Supervision of high risk pregnancy in first trimester             SINCERE BourgeoisM

## 2018-08-06 NOTE — DISCHARGE SUMMARY
[facility]    Discharge Summary  Obstetrics    Date of Admission:  2018  Date of Discharge:  2018  Discharging Provider: Tabatha Ortega  Date of Service (when I saw the patient): 18    Discharge Diagnoses   NDVD 2018   Lactating mother      History of Present Illness   Elizabeth Crooks is a 33 year old female who presented with spontaneous onset of labor on 2018    Hospital Course   The patient's hospital course was unremarkable.  She recovered as anticipated and experienced no post-delivery complications.  On discharge, her pain was well controlled. Vaginal bleeding is stable.  Voiding without difficulty.  Ambulating well and tolerating a normal diet.  No fever.  Breastfeeding is going very well.  Infant is stable.  She was discharged on post-partum day #1    Post-partum hemoglobin:   Hemoglobin   Date Value Ref Range Status   2018 11.8 11.7 - 15.7 g/dL Final       Tabatha Ortega    Discharge Disposition   Discharged to home   Condition at discharge: Good    Primary Care Physician   [facility]    Consultations This Hospital Stay   ANESTHESIOLOGY IP CONSULT  HOME CARE POST PARTUM/ IP CONSULT  LACTATION IP CONSULT    Discharge Orders     Activity   Review discharge instructions     Reason for your hospital stay   Maternity care     Discharge Instructions - Postpartum visit   Schedule a postpartum visit with your provider and return to clinic in 6 weeks. Your delivering provider with call you for follow-up in 2-3 weeks.     Diet   Resume previous diet       Discharge Medications   Current Discharge Medication List      START taking these medications    Details   acetaminophen (TYLENOL) 325 MG tablet Take 2 tablets (650 mg) by mouth every 4 hours as needed for mild pain or fever (greater than or equal to 38  C /100.4  F (oral) or 38.5  C/ 101.4  F (core).)  Qty: 100 tablet, Refills: 0    Associated Diagnoses: Lactating mother;  (normal  spontaneous vaginal delivery); Postpartum pain      hydrocortisone 2.5 % cream Place rectally 3 times daily as needed (hemorrhoids)  Refills: 0    Associated Diagnoses:  (normal spontaneous vaginal delivery)      ibuprofen (ADVIL/MOTRIN) 600 MG tablet Take 1 tablet (600 mg) by mouth every 6 hours as needed for other (cramping)  Refills: 0    Associated Diagnoses: Lactating mother;  (normal spontaneous vaginal delivery); Postpartum pain      lanolin ointment Apply topically every hour as needed for dry skin (sore nipples)    Associated Diagnoses: Lactating mother         CONTINUE these medications which have NOT CHANGED    Details   Cholecalciferol (VITAMIN D-3 PO) Take 4,000 Units by mouth      levothyroxine (SYNTHROID) 50 MCG tablet Take 1 tablet (50 mcg) by mouth daily  Qty: 90 tablet, Refills: 1    Associated Diagnoses: Supervision of high risk pregnancy in first trimester      Prenatal Vit-Fe Fumarate-FA (PRENATAL MULTIVITAMIN PLUS IRON) 27-0.8 MG TABS per tablet Take 1 tablet by mouth daily    Associated Diagnoses: Supervision of high risk pregnancy in first trimester           Allergies   No Known Allergies     Tabatha POST CNM

## 2018-08-06 NOTE — PLAN OF CARE
Discharge instructions reviewed with pt and .  Pt verbalizes understanding of d/c instructions.  D/C to home with  and infant.

## 2018-08-06 NOTE — PLAN OF CARE
Problem: Patient Care Overview  Goal: Plan of Care/Patient Progress Review  Outcome: Improving  Vital signs stable. Fundus firm, scant flow. Baby breastfeeding well, on demand feedings encouraged. Pain managed with tylenol & ibuprofen. Patient independent with self and  cares. On pathway, will continue to monitor.

## 2018-08-06 NOTE — PLAN OF CARE
Problem: Patient Care Overview  Goal: Plan of Care/Patient Progress Review  Outcome: Improving  Pt reports mild cramping pain; tolerable with tylenol and ibuprofen. Infant breastfeeding well. Parents eager to discharge this evening. Will review discharge instructions prior to discharge.

## 2018-08-06 NOTE — LACTATION NOTE
Initial visit with Elizabeth.   Breastfeeding general information reviewed.   Advised to breastfeed exclusively, on demand, avoid pacifiers, bottles and formula unless medically indicated.  Encouraged rooming in, skin to skin, feeding on demand 8-12x/day or sooner if baby cues.  Explained benefits of holding and skin to skin.  Encouraged lots of skin to skin. Instructed on hand expression.   Continues to nurse well per mom. Getting ready for discharge.  Plan: Watch for feeding cues and feed every 2-3 hours and/or on demand. Continue to use feeding log to track intake and appropriate voids and stools. Take feeding log to first follow up appointment or weight check. Encourage skin to skin to promote frequent feedings, thermoregulation and bonding. Follow-up with healthcare provider or lactation consultant for questions or concerns.    No further questions at this time.   Will follow as needed.   Natalia Mccartney BSN, RN, PHN, RNC-MNN, IBCLC

## 2018-08-07 NOTE — ANESTHESIA POSTPROCEDURE EVALUATION
Patient: Elizabeth Crooks    * No procedures listed *    Diagnosis:* No pre-op diagnosis entered *  Diagnosis Additional Information: No value filed.    Anesthesia Type:  No value filed.    Note:  Anesthesia Post Evaluation    Patient location during evaluation: bedside  Patient participation: Able to fully participate in evaluation  Level of consciousness: awake and awake and alert  Pain management: adequate  Airway patency: patent  Cardiovascular status: acceptable  Respiratory status: acceptable  Hydration status: acceptable  PONV: none     Anesthetic complications: None    Comments: Ambulating.  Denies HA, paresthesias or complications related to epidural.          Last vitals:  Vitals:    08/06/18 0130 08/06/18 0805 08/06/18 1552   BP: 121/79 125/75 133/81   Pulse: 76 77    Resp:  16 18   Temp: 36.8  C (98.3  F) 36.6  C (97.8  F) 37  C (98.6  F)   SpO2:            Electronically Signed By: Foreign Chawla MD  August 6, 2018  10:37 PM

## 2018-08-10 ENCOUNTER — TELEPHONE (OUTPATIENT)
Dept: OBGYN | Facility: CLINIC | Age: 33
End: 2018-08-10

## 2018-08-10 NOTE — TELEPHONE ENCOUNTER
18    Small clots passed this morning. Not bleeding much at all beyond. Walked the neighborhood yesterday and slept well last night. Awoke with the clots. Denies pain. Denies dizziness. Feeling fine.  Reassured pt that this is ok especially with picking up her activity. Continue to watch bleeding and amount. Call if changing a pad an hour or 2, feelings of dizziness or SOB or intense pain.  Pt verbalized understanding and no further questions.

## 2018-09-17 ENCOUNTER — PRENATAL OFFICE VISIT (OUTPATIENT)
Dept: MIDWIFE SERVICES | Facility: CLINIC | Age: 33
End: 2018-09-17
Payer: COMMERCIAL

## 2018-09-17 ENCOUNTER — RESULT FOLLOW UP (OUTPATIENT)
Dept: MIDWIFE SERVICES | Facility: CLINIC | Age: 33
End: 2018-09-17

## 2018-09-17 VITALS — WEIGHT: 249.2 LBS | SYSTOLIC BLOOD PRESSURE: 110 MMHG | DIASTOLIC BLOOD PRESSURE: 68 MMHG | BODY MASS INDEX: 40.22 KG/M2

## 2018-09-17 DIAGNOSIS — R87.618 PAP SMEAR ABNORMALITY OF CERVIX/HUMAN PAPILLOMAVIRUS (HPV) POSITIVE: ICD-10-CM

## 2018-09-17 DIAGNOSIS — Z12.4 SCREENING FOR CERVICAL CANCER: ICD-10-CM

## 2018-09-17 DIAGNOSIS — Z30.09 ENCOUNTER FOR COUNSELING REGARDING INITIATION OF OTHER CONTRACEPTIVE MEASURE: ICD-10-CM

## 2018-09-17 DIAGNOSIS — E03.8 OTHER SPECIFIED HYPOTHYROIDISM: ICD-10-CM

## 2018-09-17 DIAGNOSIS — Z23 NEED FOR PROPHYLACTIC VACCINATION AND INOCULATION AGAINST INFLUENZA: ICD-10-CM

## 2018-09-17 PROBLEM — O99.213 OBESITY AFFECTING PREGNANCY IN THIRD TRIMESTER: Status: RESOLVED | Noted: 2017-12-13 | Resolved: 2018-09-17

## 2018-09-17 PROCEDURE — 84443 ASSAY THYROID STIM HORMONE: CPT | Performed by: ADVANCED PRACTICE MIDWIFE

## 2018-09-17 PROCEDURE — 36415 COLL VENOUS BLD VENIPUNCTURE: CPT | Performed by: ADVANCED PRACTICE MIDWIFE

## 2018-09-17 PROCEDURE — 99207 ZZC POST PARTUM EXAM: CPT | Performed by: ADVANCED PRACTICE MIDWIFE

## 2018-09-17 PROCEDURE — G0145 SCR C/V CYTO,THINLAYER,RESCR: HCPCS | Performed by: ADVANCED PRACTICE MIDWIFE

## 2018-09-17 PROCEDURE — 90471 IMMUNIZATION ADMIN: CPT | Performed by: ADVANCED PRACTICE MIDWIFE

## 2018-09-17 PROCEDURE — 90686 IIV4 VACC NO PRSV 0.5 ML IM: CPT | Performed by: ADVANCED PRACTICE MIDWIFE

## 2018-09-17 PROCEDURE — 87624 HPV HI-RISK TYP POOLED RSLT: CPT | Performed by: ADVANCED PRACTICE MIDWIFE

## 2018-09-17 RX ORDER — LEVOTHYROXINE SODIUM 50 UG/1
50 TABLET ORAL DAILY
Qty: 90 TABLET | Refills: 1 | Status: SHIPPED | OUTPATIENT
Start: 2018-09-17 | End: 2019-05-07

## 2018-09-17 RX ORDER — ETONOGESTREL AND ETHINYL ESTRADIOL VAGINAL RING .015; .12 MG/D; MG/D
RING VAGINAL
Qty: 3 EACH | Refills: 3 | Status: SHIPPED | OUTPATIENT
Start: 2018-09-17

## 2018-09-17 ASSESSMENT — ANXIETY QUESTIONNAIRES
GAD7 TOTAL SCORE: 1
3. WORRYING TOO MUCH ABOUT DIFFERENT THINGS: NOT AT ALL
2. NOT BEING ABLE TO STOP OR CONTROL WORRYING: NOT AT ALL
1. FEELING NERVOUS, ANXIOUS, OR ON EDGE: SEVERAL DAYS
7. FEELING AFRAID AS IF SOMETHING AWFUL MIGHT HAPPEN: NOT AT ALL
5. BEING SO RESTLESS THAT IT IS HARD TO SIT STILL: NOT AT ALL
IF YOU CHECKED OFF ANY PROBLEMS ON THIS QUESTIONNAIRE, HOW DIFFICULT HAVE THESE PROBLEMS MADE IT FOR YOU TO DO YOUR WORK, TAKE CARE OF THINGS AT HOME, OR GET ALONG WITH OTHER PEOPLE: NOT DIFFICULT AT ALL
6. BECOMING EASILY ANNOYED OR IRRITABLE: NOT AT ALL

## 2018-09-17 ASSESSMENT — PATIENT HEALTH QUESTIONNAIRE - PHQ9: 5. POOR APPETITE OR OVEREATING: NOT AT ALL

## 2018-09-17 NOTE — PROGRESS NOTES
Midwife Postpartum 6 Week Visit    Elizabeth Crooks is a 33 year old here for a postpartum checkup. Here with FOB, Mark, and daughter, Jimenez.    Delivery date was 18. She had a  of a viable girl, name Jimenez Campbell weight 8 pounds 2 oz., with no complications      Since delivery, she has been breastfeeding.  She has not had any signs of infection, her lochia stopped after about 4 weeks. She has not resumed menstruating. She has not had other complications.    She is voiding and having bowel movements without difficulty.     Contraception was discussed and patient desires the nuvaring, and is willing to use condoms until 12 weeks postpartum when she can begin using an estrogen-based birth control method.   She has not had intercourse since delivery.   She complains of occasional perineal discomfort that resolves with position change. Mark is planning on getting a vasectomy eventually.    Elizabeth would like a refill of her synthroid and the name brand. She plans to establish care with a primary care physician or PA at [facility] in the next few months.     Mood is Stable  Patient screened for postpartum depression.   Depression Rating was:   Last PHQ-9 score on record =   PHQ-9 SCORE 2018   Total Score 2     Last GAD7 score on record =   OMAR-7 SCORE 2018   Total Score 1       ROS:  12 point review of systems negative other than symptoms noted below.       Current Outpatient Prescriptions:      etonogestrel-ethinyl estradiol (NUVARING) 0.12-0.015 MG/24HR vaginal ring, Insert 1 ring vaginally every 21 days then remove for 1 week then repeat with new ring., Disp: 3 each, Rfl: 3     levothyroxine (SYNTHROID) 50 MCG tablet, Take 1 tablet (50 mcg) by mouth daily, Disp: 90 tablet, Rfl: 1     Prenatal Vit-Fe Fumarate-FA (PRENATAL MULTIVITAMIN PLUS IRON) 27-0.8 MG TABS per tablet, Take 1 tablet by mouth daily, Disp: , Rfl:      Cholecalciferol (VITAMIN D-3 PO), Take 4,000 Units by mouth, Disp: , Rfl:       [DISCONTINUED] levothyroxine (SYNTHROID) 50 MCG tablet, Take 1 tablet (50 mcg) by mouth daily, Disp: 90 tablet, Rfl: 1.   Obstetric History       T2      L2     SAB0   TAB0   Ectopic0   Multiple0   Live Births2       # Outcome Date GA Lbr Yao/2nd Weight Sex Delivery Anes PTL Lv   2 Term 18 41w2d 03:20 / 00:22 8 lb 2 oz (3.685 kg) F Vag-Spont EPI  MAREK      Name: LALA RANDALL FELICITY      Complications: GBS      Apgar1:  9                Apgar5: 9   1 Term 10/11/16    M  EPI  MAREK        Last pap:  No results found for: PAP done today  Hgb in hospital was 11.2    EXAM:  /68 (BP Location: Right arm, Patient Position: Chair, Cuff Size: Adult Regular)  Wt 249 lb 3.2 oz (113 kg)  LMP 10/11/2017  BMI 40.22 kg/m2  BMI: Body mass index is 40.22 kg/(m^2).  Constitutional: healthy, alert and no distress  Neck: symmetrical, thyroid normal size, no masses present, no lymphadenopathy present.   Breast: deferred, patient lactating.  Abdomen: soft, non-tender, minimal diastasis    PELVIC EXAM:  Vulva: No lesions, well healed, BUS WNL, some tenderness with speculum placement  Vagina: Moist, pink, discharge normal  well rugated, no lesions  Cervix:smooth, pink, no visible lesions  Uterus: Involuted to normal size, non-tender, no masses palpated  Ovaries: No masses palpated  Pelvic tone: moderate  Rectal exam: deferred      ASSESSMENT:   Normal postpartum exam after .    ICD-10-CM    1. Routine postpartum follow-up Z39.2    2. Other specified hypothyroidism E03.8 TSH with free T4 reflex   3. Pap smear abnormality of cervix/human papillomavirus (HPV) positive R87.89 Pap imaged thin layer screen with HPV - recommended age 30 - 65 years (select HPV order below)   4. Screening for cervical cancer Z12.4 Pap imaged thin layer screen with HPV - recommended age 30 - 65 years (select HPV order below)   5. Encounter for counseling regarding initiation of other contraceptive measure Z30.09  etonogestrel-ethinyl estradiol (NUVARING) 0.12-0.015 MG/24HR vaginal ring         PLAN:    Return as needed or at time of next expected pap, pelvic, or breast exam.  Teaching: birth control and weight/diet  Family Planning: condoms now, nuvaring at 12 weeks PP  Discussed increased risk of VTE with combined contraceptive methods especially when used immediately postpartum, patient aware of risks and rx sent for patient to start in 6 weeks @ 12 weeks postpartum. To call with questions or concerns.   Encourage Kegels and abdominal exercise.  Continue a multivitamin/prenatal supplement, especially if breastfeeding.  Pap smear was obtained today. Discussed guidelines and history, will establish plan going forward pending results  TSH with reflex done today.  Synthroid & nuvaring ordered today with directions for follow up  Flu shot given today      Return to clinic: Establish care with family medicine or adult medicine for follow up re: hypothyroidism      BECKY Lucas  [facility]    KAMERON, Cris Seaman, am serving as a scribe; to document services personally performed by Anupama POST CNM- based on data collection and the provider's statements to me.    Provider Disclosure:  I agree with above History, Review of Systems, Physical exam and Plan. I have reviewed the content of the documentation and have edited it as needed. I have personally performed the services documented here and the documentation accurately represents those services and the decisions I have made.    SINCERE Ireland CNM

## 2018-09-17 NOTE — MR AVS SNAPSHOT
After Visit Summary   9/17/2018    Elizabeth Crooks    MRN: [id number]           Patient Information     Date Of Birth          1985        Visit Information        Provider Department      9/17/2018 9:30 AM Anupama Byers APRN CNM [facility]        Today's Diagnoses     Routine postpartum follow-up    -  1    Other specified hypothyroidism        Pap smear abnormality of cervix/human papillomavirus (HPV) positive        Screening for cervical cancer        Encounter for counseling regarding initiation of other contraceptive measure           Follow-ups after your visit        Follow-up notes from your care team     Return in about 1 year (around 9/17/2019) for Physical Exam.      Who to contact     If you have questions or need follow up information about today's clinic visit or your schedule please contact [facility] directly at [phone].  Normal or non-critical lab and imaging results will be communicated to you by [employer], letter or phone within 4 business days after the clinic has received the results. If you do not hear from us within 7 days, please contact the clinic through [employer] or phone. If you have a critical or abnormal lab result, we will notify you by phone as soon as possible.  Submit refill requests through [employer] or call your pharmacy and they will forward the refill request to us. Please allow 3 business days for your refill to be completed.          Additional Information About Your Visit        MyChart Information     [employer] gives you secure access to your electronic health record. If you see a primary care provider, you can also send messages to your care team and make appointments. If you have questions, please call your primary care clinic.  If you do not have a primary care provider, please call [phone] and they will assist you.        Care EveryWhere ID     This is your Care EveryWhere ID. This could be  used by other organizations to access your [facility] medical records  [id number]        Your Vitals Were     Last Period BMI (Body Mass Index)                10/11/2017 40.22 kg/m2           Blood Pressure from Last 3 Encounters:   09/17/18 110/68   08/06/18 133/81   08/03/18 128/80    Weight from Last 3 Encounters:   09/17/18 249 lb 3.2 oz (113 kg)   08/03/18 266 lb 12.8 oz (121 kg)   07/31/18 266 lb (120.7 kg)              We Performed the Following     Pap imaged thin layer screen with HPV - recommended age 30 - 65 years (select HPV order below)     TSH with free T4 reflex          Today's Medication Changes          These changes are accurate as of 9/17/18 11:22 AM.  If you have any questions, ask your nurse or doctor.               Start taking these medicines.        Dose/Directions    etonogestrel-ethinyl estradiol 0.12-0.015 MG/24HR vaginal ring   Commonly known as:  NUVARING   Used for:  Encounter for counseling regarding initiation of other contraceptive measure        Insert 1 ring vaginally every 21 days then remove for 1 week then repeat with new ring.   Quantity:  3 each   Refills:  3            Where to get your medicines      These medications were sent to [id number] - [facility] [address]  [address], [place] [address]     Phone:  [phone]     etonogestrel-ethinyl estradiol 0.12-0.015 MG/24HR vaginal ring    levothyroxine 50 MCG tablet                Primary Care Provider Office Phone # Fax #    [facility] [phone] [address]  [facility] [address]        Equal Access to Services     KYLE ROCHA AH: Hadii alejandrina Chau, wagerardoda ludailyadaha, qaybta kaalmada yadira whitman. So [facility] [phone].    ATENCIÓN: Si habla español, tiene a mcrae disposición servicios gratuitos de asistencia lingüística. Llame al [phone].    We comply with applicable federal civil rights  laws and [place] laws. We do not discriminate on the basis of race, color, national origin, age, disability, sex, sexual orientation, or gender identity.            Thank you!     Thank you for choosing [facility]  for your care. Our goal is always to provide you with excellent care. Hearing back from our patients is one way we can continue to improve our services. Please take a few minutes to complete the written survey that you may receive in the mail after your visit with us. Thank you!             Your Updated Medication List - Protect others around you: Learn how to safely use, store and throw away your medicines at www.disposemymeds.org.          This list is accurate as of 9/17/18 11:22 AM.  Always use your most recent med list.                   Brand Name Dispense Instructions for use Diagnosis    etonogestrel-ethinyl estradiol 0.12-0.015 MG/24HR vaginal ring    NUVARING    3 each    Insert 1 ring vaginally every 21 days then remove for 1 week then repeat with new ring.    Encounter for counseling regarding initiation of other contraceptive measure       levothyroxine 50 MCG tablet    SYNTHROID    90 tablet    Take 1 tablet (50 mcg) by mouth daily        prenatal multivitamin plus iron 27-0.8 MG Tabs per tablet      Take 1 tablet by mouth daily    Supervision of high risk pregnancy in first trimester       VITAMIN D-3 PO      Take 4,000 Units by mouth

## 2018-09-17 NOTE — LETTER
October 8, 2019      Elizabeth Crooks  [address] [facility] [address]    Dear MsKristina,      At [facility], your health and wellness is our primary concern. That is why we are following up on a colposcopy from 10/22/18. Your provider had recommended that you have a Pap smear and HPV test completed by 10/22/19. Our records do not show that this has been scheduled.    It is important to complete the follow up that your provider has suggested for you to ensure that there are no worsening changes which may, over time, develop into cancer.      Please contact our office at  [phone] to schedule an appointment for a Pap smear and HPV test at your earliest convenience. If you have questions or concerns, please call the clinic and we will be happy to assist you.    If you have completed the tests outside of [facility], please have the results forwarded to our office. We will update the chart for your primary Physician to review before your next annual physical.     Thank you for choosing [facility]!    Sincerely,      Your [facility] Care Team/[facility]

## 2018-09-18 LAB — TSH SERPL DL<=0.005 MIU/L-ACNC: 0.61 MU/L (ref 0.4–4)

## 2018-09-18 ASSESSMENT — ANXIETY QUESTIONNAIRES: GAD7 TOTAL SCORE: 1

## 2018-09-18 ASSESSMENT — PATIENT HEALTH QUESTIONNAIRE - PHQ9: SUM OF ALL RESPONSES TO PHQ QUESTIONS 1-9: 2

## 2018-09-20 LAB
COPATH REPORT: NORMAL
PAP: NORMAL

## 2018-09-21 PROBLEM — R87.618 PAP SMEAR ABNORMALITY OF CERVIX/HUMAN PAPILLOMAVIRUS (HPV) POSITIVE: Status: ACTIVE | Noted: 2017-12-13

## 2018-09-21 LAB
FINAL DIAGNOSIS: ABNORMAL
HPV HR 12 DNA CVX QL NAA+PROBE: POSITIVE
HPV16 DNA SPEC QL NAA+PROBE: NEGATIVE
HPV18 DNA SPEC QL NAA+PROBE: NEGATIVE
SPECIMEN DESCRIPTION: ABNORMAL
SPECIMEN SOURCE CVX/VAG CYTO: ABNORMAL

## 2018-09-21 NOTE — PROGRESS NOTES
History of abnormal Pap?  Yes: abnormal +HPV 3 years ago, colpo and biopsy (no precancer per pt), repeated pap in one year ASCUS (+HPV),  Pap in March 2017 (Abnormal with HPV), had a colpo but did not do a biopsy.  Plan to do a follow-up pap/HPV postpartum.  2015 + HPV  2016 + HPV  9/17/18 NIL pap, + HR HPV (not 16/18). Plan: colpo   9/24/18 LM for pt to return call to [phone].  10/1/18 detailed msg left . mychart also sent.   10/22/18 Colpo: HPV effect, benign.  Plan: cotest in 1 year ([facility])  10/08/19 Cotest reminder letter sent. ([facility])   11/05/19 Spoke with pt, has [place] out of [place] and transferred care ([facility])

## 2018-10-22 ENCOUNTER — OFFICE VISIT (OUTPATIENT)
Dept: OBGYN | Facility: CLINIC | Age: 33
End: 2018-10-22
Payer: COMMERCIAL

## 2018-10-22 DIAGNOSIS — Z01.812 PRE-PROCEDURE LAB EXAM: ICD-10-CM

## 2018-10-22 DIAGNOSIS — B97.7 HPV (HUMAN PAPILLOMA VIRUS) INFECTION: Primary | ICD-10-CM

## 2018-10-22 PROCEDURE — 88305 TISSUE EXAM BY PATHOLOGIST: CPT | Performed by: OBSTETRICS & GYNECOLOGY

## 2018-10-22 PROCEDURE — 57455 BIOPSY OF CERVIX W/SCOPE: CPT | Performed by: OBSTETRICS & GYNECOLOGY

## 2018-10-22 NOTE — PROGRESS NOTES
Colposcopy/LEEP  Date/Time: 10/22/2018 3:02 PM  Performed by: CISCO CARBAJAL  Authorized by: CISCO CARBAJAL     Consent:     Consent obtained:  Written    Patient questions answered: yes      Patient agrees, verbalizes understanding, and wants to proceed: yes      Instructions and paperwork completed: yes    Pre-procedure:     Pre-procedure timeout performed: yes      Premeds:  Ibuprofen    Prepped with: acetic acid    Indication:     Indication:  HPV    HPV Indications:  Other high risk  Procedure:     Procedure: Colposcopy w/ biopsy of cervix      Under satisfactory analgesia the patient was prepped and draped in the dorsal lithotomy position: yes      Crawford speculum was placed in the vagina: yes      Under colposcopic examination the transition zone was seen in entirety: yes      Cervical biopsy performed with a cervical biopsy punch: yes      Monsel's solution was applied: yes      Biopsy(s): yes      Location:  12,6    Specimen to pathology: yes    Post-procedure:     Findings: White epithelium      Impression: Low grade cervical dysplasia      Patient tolerance of procedure:  Patient tolerated the procedure well with no immediate complications        Pap history: abnormal +HPV 3 years ago, colpo and biopsy (no precancer per pt), repeated pap in one year ASCUS (+HPV),  Pap in March 2017 (Abnormal with HPV), had a colpo but did not do a biopsy.  Plan to do a follow-up pap/HPV postpartum.  2015 + HPV  2016 + HPV  9/17/18 NIL pap, + HR HPV (not 16/18).      Pt instructed on aftercare and precautions.   Will be called with results in 7 days.     Cisco Carbajal, DO

## 2018-10-22 NOTE — MR AVS SNAPSHOT
After Visit Summary   10/22/2018    Elizabeth Crooks    MRN: [id number]           Patient Information     Date Of Birth          1985        Visit Information        Provider Department      10/22/2018 2:50 PM Cha Carbajal DO; WE COLPOSCOPE 2 [facility]        Today's Diagnoses     HPV (human papilloma virus) infection    -  1    Pre-procedure lab exam           Follow-ups after your visit        Who to contact     If you have questions or need follow up information about today's clinic visit or your schedule please contact [facility] directly at [phone].  Normal or non-critical lab and imaging results will be communicated to you by [facility], letter or phone within 4 business days after the clinic has received the results. If you do not hear from us within 7 days, please contact the clinic through [facility] or phone. If you have a critical or abnormal lab result, we will notify you by phone as soon as possible.  Submit refill requests through [facility] or call your pharmacy and they will forward the refill request to us. Please allow 3 business days for your refill to be completed.          Additional Information About Your Visit        MyChart Information     [facility] gives you secure access to your electronic health record. If you see a primary care provider, you can also send messages to your care team and make appointments. If you have questions, please call your primary care clinic.  If you do not have a primary care provider, please call [phone] and they will assist you.        Care EveryWhere ID     This is your Care EveryWhere ID. This could be used by other organizations to access your [facility] medical records  [id number]         Blood Pressure from Last 3 Encounters:   09/17/18 110/68   08/06/18 133/81   08/03/18 128/80    Weight from Last 3 Encounters:   09/17/18 249 lb 3.2 oz (113 kg)   08/03/18 266 lb 12.8 oz (121 kg)    07/31/18 266 lb (120.7 kg)              We Performed the Following     COLPOSCOPY     Surgical pathology exam        Primary Care Provider Office Phone # Fax #    [facility] [phone] [id number]  [facility] [address]        Equal Access to Services     KYLE ROCHA : Hadii aad ku hadasho Soomaali, waaxda luqadaha, qaybta kaalmada adeegyada, waxay idiin hayaan adebree turcios lajuliet . So [facility] [phone].    ATENCIÓN: Si habla español, tiene a mcrae disposición servicios gratuitos de asistencia lingüística. Llame al [phone].    We comply with applicable federal civil rights laws and [place] laws. We do not discriminate on the basis of race, color, national origin, age, disability, sex, sexual orientation, or gender identity.            Thank you!     Thank you for choosing [facility]  for your care. Our goal is always to provide you with excellent care. Hearing back from our patients is one way we can continue to improve our services. Please take a few minutes to complete the written survey that you may receive in the mail after your visit with us. Thank you!             Your Updated Medication List - Protect others around you: Learn how to safely use, store and throw away your medicines at www.disposemymeds.org.          This list is accurate as of 10/22/18  3:54 PM.  Always use your most recent med list.                   Brand Name Dispense Instructions for use Diagnosis    etonogestrel-ethinyl estradiol 0.12-0.015 MG/24HR vaginal ring    NUVARING    3 each    Insert 1 ring vaginally every 21 days then remove for 1 week then repeat with new ring.    Encounter for counseling regarding initiation of other contraceptive measure       levothyroxine 50 MCG tablet    SYNTHROID    90 tablet    Take 1 tablet (50 mcg) by mouth daily        prenatal multivitamin plus iron 27-0.8 MG Tabs per tablet      Take 1 tablet by mouth daily    Supervision of  high risk pregnancy in first trimester       VITAMIN D-3 PO      Take 4,000 Units by mouth

## 2018-10-24 LAB — COPATH REPORT: NORMAL

## 2019-01-23 ENCOUNTER — PRENATAL OFFICE VISIT (OUTPATIENT)
Dept: FAMILY MEDICINE | Facility: CLINIC | Age: 34
End: 2019-01-23
Payer: COMMERCIAL

## 2019-01-23 VITALS
WEIGHT: 242.1 LBS | OXYGEN SATURATION: 98 % | BODY MASS INDEX: 38.91 KG/M2 | SYSTOLIC BLOOD PRESSURE: 126 MMHG | DIASTOLIC BLOOD PRESSURE: 83 MMHG | HEART RATE: 78 BPM | HEIGHT: 66 IN

## 2019-01-23 DIAGNOSIS — E03.8 OTHER SPECIFIED HYPOTHYROIDISM: Primary | ICD-10-CM

## 2019-01-23 PROCEDURE — 84443 ASSAY THYROID STIM HORMONE: CPT | Performed by: FAMILY MEDICINE

## 2019-01-23 PROCEDURE — 99214 OFFICE O/P EST MOD 30 MIN: CPT | Performed by: FAMILY MEDICINE

## 2019-01-23 PROCEDURE — 36415 COLL VENOUS BLD VENIPUNCTURE: CPT | Performed by: FAMILY MEDICINE

## 2019-01-23 ASSESSMENT — MIFFLIN-ST. JEOR: SCORE: 1829.91

## 2019-01-23 NOTE — NURSING NOTE
"Chief Complaint   Patient presents with     Establish Care     Looking for new PCP     LMP 01/20/2019 (Approximate)   Breastfeeding? Yes  Estimated body mass index is 40.22 kg/m  as calculated from the following:    Height as of 8/3/18: 1.676 m (5' 6\").    Weight as of 9/17/18: 113 kg (249 lb 3.2 oz).  Medication Reconciliation: complete        Health Maintenance Due Pending Provider Review:  NONE    n/a    Esthela Dougherty MA Lead  [facility]  [phone]  "
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
## 2019-01-24 LAB — TSH SERPL DL<=0.005 MIU/L-ACNC: 1.05 MU/L (ref 0.4–4)

## 2019-01-29 NOTE — RESULT ENCOUNTER NOTE
Here are your lab results from your recent visit...  -Your TSH (thyroid stimulating hormone, which is elevated in hypothyroidism, and lowered in hyperthyroidism) looks very good so you can continue on the same dose of levothyroxine.    It was good to meet you! Please let me know if you have any questions.  Best,   Camilo Contreras MD

## 2019-04-29 RX ORDER — LEVOTHYROXINE SODIUM 50 MCG
TABLET ORAL
Qty: 30 TABLET | Refills: 0 | OUTPATIENT
Start: 2019-04-29

## 2019-04-29 NOTE — TELEPHONE ENCOUNTER
"Requested Prescriptions   Pending Prescriptions Disp Refills     SYNTHROID 50 MCG tablet [Pharmacy Med Name: Synthroid Oral Tablet 50 MCG] 30 tablet 0     Sig: TAKE ONE TABLET BY MOUTH ONE TIME DAILY       Thyroid Protocol Passed - 4/27/2019  7:00 AM        Passed - Patient is 12 years or older        Passed - Recent (12 mo) or future (30 days) visit within the authorizing provider's specialty     Patient had office visit in the last 12 months or has a visit in the next 30 days with authorizing provider or within the authorizing provider's specialty.  See \"Patient Info\" tab in inbasket, or \"Choose Columns\" in Meds & Orders section of the refill encounter.              Passed - Medication is active on med list        Passed - Normal TSH on file in past 12 months     Recent Labs   Lab Test 01/23/19  1500   TSH 1.05              Passed - No active pregnancy on record     If patient is pregnant or has had a positive pregnancy test, please check TSH.          Passed - No positive pregnancy test in past 12 months     If patient is pregnant or has had a positive pregnancy test, please check TSH.          Last Written Prescription Date:  9/17/18  Last Fill Quantity: 90,  # refills: 1   Last office visit: 9/17/2018 with prescribing provider:  Julee Byers   Future Office Visit:  None  Refill denied-pt being followed by a different provider  Lena Bush RN on 4/29/2019 at 8:33 AM      " Take Claritin 10 mg once daily. Use Flonase (or Nasonex) nasal spray, one spray to each nostril once daily.

## 2019-05-06 DIAGNOSIS — E03.8 OTHER SPECIFIED HYPOTHYROIDISM: Primary | ICD-10-CM

## 2019-05-07 DIAGNOSIS — E03.8 OTHER SPECIFIED HYPOTHYROIDISM: Primary | ICD-10-CM

## 2019-05-07 RX ORDER — LEVOTHYROXINE SODIUM 50 MCG
50 TABLET ORAL DAILY
Qty: 90 TABLET | Refills: 1 | Status: CANCELLED | OUTPATIENT
Start: 2019-05-07

## 2019-05-07 RX ORDER — LEVOTHYROXINE SODIUM 50 MCG
50 TABLET ORAL DAILY
Qty: 90 TABLET | Refills: 1 | Status: SHIPPED | OUTPATIENT
Start: 2019-05-07

## 2019-05-07 RX ORDER — LEVOTHYROXINE SODIUM 50 UG/1
50 TABLET ORAL DAILY
Qty: 90 TABLET | Refills: 1 | Status: SHIPPED | OUTPATIENT
Start: 2019-05-07

## 2019-05-07 NOTE — TELEPHONE ENCOUNTER
CW,   Below message is wrong  Rx approved today by triage   Pharmacy states pt always gets MARISA brand name  This Rx is for generic  Pharmacy wondering if can resend as MARISA   They said last Rx from Dr Byers also   Are you taking this over they want to know?  Pended MARISA order if you approve  Thanks,  Stacy BLACK RN

## 2019-05-07 NOTE — TELEPHONE ENCOUNTER
Sent new rx for brand synthroid 50mcg #90 with one refill- sent just now, but from different encounter.  Fine with taking over rx as she came in for establishing care appt in 1/19.  Have UTD TSH from that time, and we can do a physical appt in ~9/19.  Thanks -CW

## 2019-05-07 NOTE — TELEPHONE ENCOUNTER
Prescription approved per [facility] [place].  Lilliam Cuenca RN

## 2019-05-07 NOTE — TELEPHONE ENCOUNTER
Pharmacy called and wanted to know if there was a reason why the the   RX was written for brand name levothyroxine (SYNTHROID) 50 MCG tablet when the Pt usually get the generic.

## 2019-11-07 DIAGNOSIS — E03.8 OTHER SPECIFIED HYPOTHYROIDISM: ICD-10-CM

## 2019-11-07 NOTE — TELEPHONE ENCOUNTER
"Synthroid 50MCG  Last Written Prescription Date:  05/07/2019  Last Fill Quantity: 90,  # refills: 1   Last office visit: 1/23/2019 with prescribing provider:  Yes    Future Office Visit:      Requested Prescriptions   Pending Prescriptions Disp Refills     SYNTHROID 50 MCG tablet [Pharmacy Med Name: Synthroid 50 Mcg Tab Abbv] 30 tablet 0     Sig: TAKE ONE TABLET BY MOUTH ONE TIME DAILY       Thyroid Protocol Failed - 11/7/2019  2:32 AM        Failed - Recent (12 mo) or future (30 days) visit within the authorizing provider's specialty     Patient has had an office visit with the authorizing provider or a provider within the authorizing providers department within the previous 12 mos or has a future within next 30 days. See \"Patient Info\" tab in inbasket, or \"Choose Columns\" in Meds & Orders section of the refill encounter.              Passed - Patient is 12 years or older        Passed - Medication is active on med list        Passed - Normal TSH on file in past 12 months     Recent Labs   Lab Test 01/23/19  1500   TSH 1.05              Passed - No active pregnancy on record     If patient is pregnant or has had a positive pregnancy test, please check TSH.          Passed - No positive pregnancy test in past 12 months     If patient is pregnant or has had a positive pregnancy test, please check TSH.            "

## 2019-11-13 RX ORDER — LEVOTHYROXINE SODIUM 50 MCG
TABLET ORAL
Start: 2019-11-13

## 2020-12-27 ENCOUNTER — HEALTH MAINTENANCE LETTER (OUTPATIENT)
Age: 35
End: 2020-12-27

## 2021-10-09 ENCOUNTER — HEALTH MAINTENANCE LETTER (OUTPATIENT)
Age: 36
End: 2021-10-09

## 2022-01-29 ENCOUNTER — HEALTH MAINTENANCE LETTER (OUTPATIENT)
Age: 37
End: 2022-01-29

## 2022-02-17 PROBLEM — O09.93 SUPERVISION OF HIGH RISK PREGNANCY IN THIRD TRIMESTER: Status: RESOLVED | Noted: 2017-12-13 | Resolved: 2018-09-17

## 2022-09-17 ENCOUNTER — HEALTH MAINTENANCE LETTER (OUTPATIENT)
Age: 37
End: 2022-09-17

## 2023-05-06 ENCOUNTER — HEALTH MAINTENANCE LETTER (OUTPATIENT)
Age: 38
End: 2023-05-06